# Patient Record
Sex: FEMALE | Race: BLACK OR AFRICAN AMERICAN | NOT HISPANIC OR LATINO | Employment: OTHER | URBAN - METROPOLITAN AREA
[De-identification: names, ages, dates, MRNs, and addresses within clinical notes are randomized per-mention and may not be internally consistent; named-entity substitution may affect disease eponyms.]

---

## 2018-02-01 ENCOUNTER — RADIANT APPOINTMENT (OUTPATIENT)
Dept: GENERAL RADIOLOGY | Facility: CLINIC | Age: 33
End: 2018-02-01
Attending: PHYSICIAN ASSISTANT
Payer: COMMERCIAL

## 2018-02-01 ENCOUNTER — OFFICE VISIT (OUTPATIENT)
Dept: URGENT CARE | Facility: URGENT CARE | Age: 33
End: 2018-02-01
Payer: COMMERCIAL

## 2018-02-01 VITALS — HEART RATE: 82 BPM | WEIGHT: 258.6 LBS | TEMPERATURE: 97 F | BODY MASS INDEX: 41.11 KG/M2 | OXYGEN SATURATION: 100 %

## 2018-02-01 DIAGNOSIS — S99.912A ANKLE INJURY, LEFT, INITIAL ENCOUNTER: ICD-10-CM

## 2018-02-01 DIAGNOSIS — S82.892A ANKLE FRACTURE, LEFT, CLOSED, INITIAL ENCOUNTER: Primary | ICD-10-CM

## 2018-02-01 PROCEDURE — 99204 OFFICE O/P NEW MOD 45 MIN: CPT | Performed by: PHYSICIAN ASSISTANT

## 2018-02-01 PROCEDURE — 73610 X-RAY EXAM OF ANKLE: CPT | Mod: LT

## 2018-02-01 ASSESSMENT — ENCOUNTER SYMPTOMS
RESPIRATORY NEGATIVE: 1
NEUROLOGICAL NEGATIVE: 1
GASTROINTESTINAL NEGATIVE: 1
PSYCHIATRIC NEGATIVE: 1
EYES NEGATIVE: 1
CARDIOVASCULAR NEGATIVE: 1
CONSTITUTIONAL NEGATIVE: 1

## 2018-02-01 NOTE — NURSING NOTE
"Chief Complaint   Patient presents with     Fall     Patient had fall and injured left leg       Initial Pulse 82  Temp 97  F (36.1  C) (Oral)  Wt 258 lb 9.6 oz (117.3 kg)  SpO2 100%  BMI 41.11 kg/m2 Estimated body mass index is 41.11 kg/(m^2) as calculated from the following:    Height as of 12/21/10: 5' 6.5\" (1.689 m).    Weight as of this encounter: 258 lb 9.6 oz (117.3 kg).  Medication Reconciliation: complete         Susannah Jose    "

## 2018-02-01 NOTE — PROGRESS NOTES
SUBJECTIVE:   Lani Lewis is a 32 year old female presenting with a chief complaint of   Chief Complaint   Patient presents with     Fall     Patient had fall and injured left leg   .    Onset of symptoms was 1 day(s) ago.  Course of illness is worsening.    Severity moderate  Current and Associated symptoms: pain, swelling, bruising, trouble walking  Treatment measures tried include wrap, icy hot.  Predisposing factors include None.    She fell last night  She slipped on the ice and rolled her ankle  She could not sleep last night  She can walk on it a little bit - it is painful  Pain is lateral  No numbness or tingling     Review of Systems   Constitutional: Negative.    HENT: Negative.    Eyes: Negative.    Respiratory: Negative.    Cardiovascular: Negative.    Gastrointestinal: Negative.    Genitourinary: Negative.    Musculoskeletal:        As in HPI   Skin: Negative.    Neurological: Negative.    Endo/Heme/Allergies: Negative.    Psychiatric/Behavioral: Negative.          Past Medical History:   Diagnosis Date     NO ACTIVE PROBLEMS      Current Outpatient Prescriptions   Medication Sig Dispense Refill     ibuprofen (ADVIL,MOTRIN) 600 MG tablet Take 1 tablet (600 mg) by mouth every 6 hours as needed for moderate pain (Patient not taking: Reported on 2/1/2018) 30 tablet 1     NO ACTIVE MEDICATIONS        Social History   Substance Use Topics     Smoking status: Never Smoker     Smokeless tobacco: Never Used     Alcohol use No     No significant family history     OBJECTIVE  Pulse 82  Temp 97  F (36.1  C) (Oral)  Wt 258 lb 9.6 oz (117.3 kg)  SpO2 100%  BMI 41.11 kg/m2    Physical Exam   Constitutional: She is oriented to person, place, and time and well-developed, well-nourished, and in no distress.   HENT:   Head: Normocephalic and atraumatic.   Neck: Normal range of motion. Neck supple.   Musculoskeletal:        Left ankle: She exhibits decreased range of motion, swelling and ecchymosis. She exhibits no  deformity, no laceration and normal pulse. Tenderness. Lateral malleolus, AITFL and head of 5th metatarsal tenderness found. No medial malleolus and no proximal fibula tenderness found.   Neurological: She is alert and oriented to person, place, and time. She has normal sensation.   Skin: Skin is warm and dry.   Psychiatric: Mood and affect normal.       Labs:  No results found for this or any previous visit (from the past 24 hour(s)).    XR ANKLE LT G/E 3 VW 2/1/2018 5:48 PM  HISTORY: Injury.  COMPARISON: None.  IMPRESSION: There is slight cortical irregularity of the distal tip of  the lateral malleolus, suggestive of a mildly displaced fracture with  overlying soft tissue swelling.  RUFUS FIGUEROA MD    ASSESSMENT:      ICD-10-CM    1. Ankle fracture, left, closed, initial encounter S82.892A ORTHO  REFERRAL     CAM WALKER     CRUTCHES, UNDERARM WOOD   2. Ankle injury, left, initial encounter S99.912A XR Ankle Left G/E 3 Views        PLAN:    MS Injury/Pain  ice, elevate, splint: CAM boot, Tylenol and Ibuprofen  Crutches given  Ok to WBAT in the CAM boot     Followup:    In 1  week(s) follow up with  Ortho    There are no Patient Instructions on file for this visit.    Laine Barakat PA-C

## 2018-02-01 NOTE — MR AVS SNAPSHOT
After Visit Summary   2/1/2018    Lani Lewis    MRN: [id number]           Patient Information     Date Of Birth          1985        Visit Information        Provider Department      2/1/2018 4:45 PM Laine Barakat PA-C [facility]        Today's Diagnoses     Ankle injury, left, initial encounter    -  1    Ankle fracture, left, closed, initial encounter           Follow-ups after your visit        Additional Services     ORTHO  REFERRAL       [facility] is referring you to the Orthopedic  Services at [facility].       The  Representative will assist you in the coordination of your Orthopedic and Musculoskeletal Care as prescribed by your physician.    The  Representative will call you within 1 business day to help schedule your appointment, or you may contact the  Representative at:    All areas ~ [phone]     Type of Referral : [facility]       Timeframe requested: 1 - 2 days    Coverage of these services is subject to the terms and limitations of your health insurance plan.  Please call member services at your health plan with any benefit or coverage questions.      If X-rays, CT or MRI's have been performed, please contact the facility where they were done to arrange for , prior to your scheduled appointment.  Please bring this referral request to your appointment and present it to your specialist.                  Who to contact     If you have questions or need follow up information about today's clinic visit or your schedule please contact [facility] directly at [phone].  Normal or non-critical lab and imaging results will be communicated to you by MyChart, letter or phone within 4 business days after the clinic has received the results. If you do not hear from us within 7 days, please contact the clinic  "through Spongecell or phone. If you have a critical or abnormal lab result, we will notify you by phone as soon as possible.  Submit refill requests through Spongecell or call your pharmacy and they will forward the refill request to us. Please allow 3 business days for your refill to be completed.          Additional Information About Your Visit        [employer] Information     Spongecell lets you send messages to your doctor, view your test results, renew your prescriptions, schedule appointments and more. To sign up, go to [email or web address] . Click on \"Log in\" on the left side of the screen, which will take you to the Welcome page. Then click on \"Sign up Now\" on the right side of the page.     You will be asked to enter the access code listed below, as well as some personal information. Please follow the directions to create your username and password.     Your access code is: [email or web address]  Expires: 2018  6:19 PM     Your access code will  in 90 days. If you need help or a new code, please call your [facility] clinic or [phone].        Care EveryWhere ID     This is your Care EveryWhere ID. This could be used by other organizations to access your [facility] medical records  [id number]        Your Vitals Were     Pulse Temperature Pulse Oximetry BMI (Body Mass Index)          82 97  F (36.1  C) (Oral) 100% 41.11 kg/m2         Blood Pressure from Last 3 Encounters:   08/05/15 (!) 127/91   11 92/60   12/21/10 93/48    Weight from Last 3 Encounters:   18 258 lb 9.6 oz (117.3 kg)   11 254 lb (115.2 kg)   12/21/10 260 lb (117.9 kg)              We Performed the Following     CAM WALKER     CRUTCHES, UNDERARM WOOD     ORTHO  REFERRAL        Primary Care Provider Fax #    Physician No Ref-Primary [phone]       No address on file        Equal Access to Services     NIKA VARGAS : Chino Galvan, waesmeda roque, qaybdaniel britton, ekaterina kelly " rodrigo grant ah. So [facility] [phone].    ATENCIÓN: Si habla liset, tiene a adam disposición servicios gratuitos de asistencia lingüística. Glenn al [phone].    We comply with applicable federal civil rights laws and [place] laws. We do not discriminate on the basis of race, color, national origin, age, disability, sex, sexual orientation, or gender identity.            Thank you!     Thank you for choosing [facility]  for your care. Our goal is always to provide you with excellent care. Hearing back from our patients is one way we can continue to improve our services. Please take a few minutes to complete the written survey that you may receive in the mail after your visit with us. Thank you!             Your Updated Medication List - Protect others around you: Learn how to safely use, store and throw away your medicines at www.disposemymeds.org.          This list is accurate as of 2/1/18  6:19 PM.  Always use your most recent med list.                   Brand Name Dispense Instructions for use Diagnosis    ibuprofen 600 MG tablet    ADVIL/MOTRIN    30 tablet    Take 1 tablet (600 mg) by mouth every 6 hours as needed for moderate pain        NO ACTIVE MEDICATIONS

## 2018-02-08 ENCOUNTER — OFFICE VISIT (OUTPATIENT)
Dept: PODIATRY | Facility: CLINIC | Age: 33
End: 2018-02-08
Payer: COMMERCIAL

## 2018-02-08 VITALS
DIASTOLIC BLOOD PRESSURE: 66 MMHG | BODY MASS INDEX: 40.01 KG/M2 | WEIGHT: 264 LBS | HEIGHT: 68 IN | SYSTOLIC BLOOD PRESSURE: 114 MMHG

## 2018-02-08 DIAGNOSIS — S82.832A CLOSED FRACTURE OF DISTAL END OF LEFT FIBULA, UNSPECIFIED FRACTURE MORPHOLOGY, INITIAL ENCOUNTER: Primary | ICD-10-CM

## 2018-02-08 PROCEDURE — 99243 OFF/OP CNSLTJ NEW/EST LOW 30: CPT | Performed by: PODIATRIST

## 2018-02-08 RX ORDER — HYDROCODONE BITARTRATE AND ACETAMINOPHEN 5; 325 MG/1; MG/1
TABLET ORAL
Qty: 30 TABLET | Refills: 0 | Status: SHIPPED | OUTPATIENT
Start: 2018-02-08 | End: 2018-07-18

## 2018-02-08 NOTE — NURSING NOTE
"Chief Complaint   Patient presents with     Ankle Pain     left ankle       Initial /66 (BP Location: Left arm, Patient Position: Sitting, Cuff Size: Adult Large)  Ht 5' 7.84\" (1.723 m)  Wt 264 lb (119.7 kg)  BMI 40.34 kg/m2 Estimated body mass index is 40.34 kg/(m^2) as calculated from the following:    Height as of this encounter: 5' 7.84\" (1.723 m).    Weight as of this encounter: 264 lb (119.7 kg).  Medication Reconciliation: complete    "

## 2018-02-08 NOTE — Clinical Note
Good morning  I saw Lani recently for her left ankle injury.  She was placed into a tall walking boot, given RICE/NSAID instructions, as well as a compression sleeve.  She'll follow up in 3 weeks and we'll likely start functional rehab at that point.  Thanks  Andre

## 2018-02-08 NOTE — MR AVS SNAPSHOT
After Visit Summary   2/8/2018    Lani Lewis    MRN: [id number]           Patient Information     Date Of Birth          1985        Visit Information        Provider Department      2/8/2018 1:45 PM Andre Ortiz DPM; [employer] [facility]        Today's Diagnoses     Closed fracture of distal end of left fibula, unspecified fracture morphology, initial encounter    -  1      Care Instructions    Thank you for choosing [facility]!    DR. ORTIZ'S CLINIC LOCATIONS:   MONDAY - [place]  TUESDAY - [place]   [address] [facility]  [address]   [place] [address] [place] [address]   [phone]       THURSDAY AM - [place] THURSDAY PM - [facility]   [address]   [place] [address] [place] [address]   [phone]       FRIDAY AM - [place] SET UP SURGERY: [phone]   [address] APPOINTMENTS: [phone]   [place] [address] BILLING QUESTIONS: [phone] FAX NUMBER: [phone]     Follow Up: in 3 weeks    PRICE Therapy    Many aches and pains throughout the foot and ankle can be helped with many simple treatments.  This is usually described as PRICE Therapy.      P - Protection - often times, inflammation/pain in the lower extremity is not able to improve simply because the areas involved are never allowed to rest.  Every step we take can bother the problematic area.  Protecting those areas is an important step in the healing process.  This may involve a walking cast boot, a special insert/orthotic device, an ankle brace, or simply avoiding barefoot walking.    R - Rest - in addition to protecting the foot/ankle, resting is an important, but often times difficult, treatment option.  Getting off your feet when they bother you, and specifically avoiding activities that cause pain/discomfort, are very beneficial to  prevent, and treat, foot/ankle pain.      I - Ice - icing regularly can help to decrease inflammation and swelling in the foot, thus decreasing pain.  Using an ice pack or a bag of frozen peas works very well.  Ice for 20 minutes multiple times per day as needed.  Do not place the ice directly on the skin as this can cause tissue damage.    C - Compression - using a compression wrap or an ACE wrap can help to decrease swelling, which can help to decrease pain.  Wearing the wraps is generally not needed at night, but they should be worn on a regular basis when you are going to be on your feet for prolonged periods as gravity tends to pull fluids down to your feet/ankles.    E - Elevation - elevating your lower extremities multiple times daily for 15-20 minutes can help to decrease swelling, which works well in decreasing pain levels.      NSAID/Tylenol - An anti-inflammatory, like Aleve or ibuprofen, and/or a pain medication, such as Tylenol, can help to improve pain levels and get the issue resolved sooner rather than later.  Anyone with liver issues should be careful with Tylenol, and anyone with high blood pressure or heart, stomach or kidney issues should be careful with anti-inflammatories.  Please ask if you have questions about these medications, including dosage.        Body Mass Index (BMI)  Many things can cause foot and ankle problems. Foot structure, activity level, foot mechanics and injuries are common causes of pain. One very important issue that often goes unmentioned, is body weight. Extra weight can cause increased stress on muscles, ligaments, bones and tendons. Sometimes just a few extra pounds is all it takes to put one over her/his threshold. Without reducing that stress, it can be difficult to alleviate pain. Some people are uncomfortable addressing this issue, but we feel it is important for you to think about it. As Foot &  Ankle specialists, our job is addressing the lower extremity problem  "and possible causes. Regarding extra body weight, we encourage patients to discuss diet and weight management plans with their primary care doctors. It is this team approach that gives you the best opportunity for pain relief and getting you back on your feet.            Follow-ups after your visit        Who to contact     If you have questions or need follow up information about today's clinic visit or your schedule please contact [facility] directly at [phone].  Normal or non-critical lab and imaging results will be communicated to you by [employer], letter or phone within 4 business days after the clinic has received the results. If you do not hear from us within 7 days, please contact the clinic through [employer] or phone. If you have a critical or abnormal lab result, we will notify you by phone as soon as possible.  Submit refill requests through [email or web address] or call your pharmacy and they will forward the refill request to us. Please allow 3 business days for your refill to be completed.          Additional Information About Your Visit        Meet.com Information     Meet.com lets you send messages to your doctor, view your test results, renew your prescriptions, schedule appointments and more. To sign up, go to [email or web address] . Click on \"Log in\" on the left side of the screen, which will take you to the Welcome page. Then click on \"Sign up Now\" on the right side of the page.     You will be asked to enter the access code listed below, as well as some personal information. Please follow the directions to create your username and password.     Your access code is: [email or web address]  Expires: 2018  6:19 PM     Your access code will  in 90 days. If you need help or a new code, please call your [facility] clinic or [phone].        Care EveryWhere ID     This is your Care EveryWhere ID. This could be used by other organizations to access your [facility] medical records  [id number]      " "  Your Vitals Were     Height BMI (Body Mass Index)                5' 7.84\" (1.723 m) 40.34 kg/m2           Blood Pressure from Last 3 Encounters:   02/08/18 114/66   08/05/15 (!) 127/91   01/21/11 92/60    Weight from Last 3 Encounters:   02/08/18 264 lb (119.7 kg)   02/01/18 258 lb 9.6 oz (117.3 kg)   01/21/11 254 lb (115.2 kg)              Today, you had the following     No orders found for display         Today's Medication Changes          These changes are accurate as of 2/8/18  2:29 PM.  If you have any questions, ask your nurse or doctor.               Start taking these medicines.        Dose/Directions    HYDROcodone-acetaminophen 5-325 MG per tablet   Commonly known as:  NORCO   Used for:  Closed fracture of distal end of left fibula, unspecified fracture morphology, initial encounter   Started by:  Andre Garcia DPM        Take 1-2 tablets every 4-6 hours as needed for pain.  Do not take other tylenol products with this medication, as too much tylenol can be damaging to the liver.   Quantity:  30 tablet   Refills:  0       order for DME   Used for:  Closed fracture of distal end of left fibula, unspecified fracture morphology, initial encounter   Started by:  Andre Garcia DPM        Equipment being ordered: tall CAm 9 1/2   Quantity:  1 Device   Refills:  0            Where to get your medicines      Some of these will need a paper prescription and others can be bought over the counter.  Ask your nurse if you have questions.     Bring a paper prescription for each of these medications     HYDROcodone-acetaminophen 5-325 MG per tablet    order for DME                Primary Care Provider Fax #    Physician No Ref-Primary [phone]       No address on file        Equal Access to Services     NIKA VARGAS : Chino Galvan, adrian nevarez, ekaterina naylor. So [facility] [phone].    ATENCIÓN: Si jeanmarie gonzalez a adam " disposición servicios gratuitos de asistencia lingüística. Glenn lafleur [phone].    We comply with applicable federal civil rights laws and [place] laws. We do not discriminate on the basis of race, color, national origin, age, disability, sex, sexual orientation, or gender identity.            Thank you!     Thank you for choosing [facility]  for your care. Our goal is always to provide you with excellent care. Hearing back from our patients is one way we can continue to improve our services. Please take a few minutes to complete the written survey that you may receive in the mail after your visit with us. Thank you!             Your Updated Medication List - Protect others around you: Learn how to safely use, store and throw away your medicines at www.disposemymeds.org.          This list is accurate as of 2/8/18  2:29 PM.  Always use your most recent med list.                   Brand Name Dispense Instructions for use Diagnosis    HYDROcodone-acetaminophen 5-325 MG per tablet    NORCO    30 tablet    Take 1-2 tablets every 4-6 hours as needed for pain.  Do not take other tylenol products with this medication, as too much tylenol can be damaging to the liver.    Closed fracture of distal end of left fibula, unspecified fracture morphology, initial encounter       ibuprofen 600 MG tablet    ADVIL/MOTRIN    30 tablet    Take 1 tablet (600 mg) by mouth every 6 hours as needed for moderate pain        NO ACTIVE MEDICATIONS           order for DME     1 Device    Equipment being ordered: tall CAm 9 1/2    Closed fracture of distal end of left fibula, unspecified fracture morphology, initial encounter

## 2018-02-08 NOTE — PATIENT INSTRUCTIONS
Thank you for choosing [facility]!    DR. ORTIZ'S CLINIC LOCATIONS:   MONDAY - [place] TUESDAY - [place]   [address] [facility]  [address]   [place] [address] [place], [place] [id number] [phone]       THURSDAY AM - [place] THURSDAY PM - [facility]   [address]   [place] [address] [place], [place] [address] [phone]       FRIDAY AM - [place] SET UP SURGERY: [phone] [address] APPOINTMENTS: [phone]   [place] [address] BILLING QUESTIONS: [phone] FAX NUMBER: [phone]     Follow Up: in 3 weeks    PRICE Therapy    Many aches and pains throughout the foot and ankle can be helped with many simple treatments.  This is usually described as PRICE Therapy.      P - Protection - often times, inflammation/pain in the lower extremity is not able to improve simply because the areas involved are never allowed to rest.  Every step we take can bother the problematic area.  Protecting those areas is an important step in the healing process.  This may involve a walking cast boot, a special insert/orthotic device, an ankle brace, or simply avoiding barefoot walking.    R - Rest - in addition to protecting the foot/ankle, resting is an important, but often times difficult, treatment option.  Getting off your feet when they bother you, and specifically avoiding activities that cause pain/discomfort, are very beneficial to prevent, and treat, foot/ankle pain.      I - Ice - icing regularly can help to decrease inflammation and swelling in the foot, thus decreasing pain.  Using an ice pack or a bag of frozen peas works very well.  Ice for 20 minutes multiple times per day as needed.  Do not place the ice directly on the skin as this can cause tissue damage.    C - Compression - using a compression wrap or an ACE wrap can help to decrease swelling, which can help to decrease pain.   Wearing the wraps is generally not needed at night, but they should be worn on a regular basis when you are going to be on your feet for prolonged periods as gravity tends to pull fluids down to your feet/ankles.    E - Elevation - elevating your lower extremities multiple times daily for 15-20 minutes can help to decrease swelling, which works well in decreasing pain levels.      NSAID/Tylenol - An anti-inflammatory, like Aleve or ibuprofen, and/or a pain medication, such as Tylenol, can help to improve pain levels and get the issue resolved sooner rather than later.  Anyone with liver issues should be careful with Tylenol, and anyone with high blood pressure or heart, stomach or kidney issues should be careful with anti-inflammatories.  Please ask if you have questions about these medications, including dosage.        Body Mass Index (BMI)  Many things can cause foot and ankle problems. Foot structure, activity level, foot mechanics and injuries are common causes of pain. One very important issue that often goes unmentioned, is body weight. Extra weight can cause increased stress on muscles, ligaments, bones and tendons. Sometimes just a few extra pounds is all it takes to put one over her/his threshold. Without reducing that stress, it can be difficult to alleviate pain. Some people are uncomfortable addressing this issue, but we feel it is important for you to think about it. As Foot &  Ankle specialists, our job is addressing the lower extremity problem and possible causes. Regarding extra body weight, we encourage patients to discuss diet and weight management plans with their primary care doctors. It is this team approach that gives you the best opportunity for pain relief and getting you back on your feet.

## 2018-02-08 NOTE — PROGRESS NOTES
"Foot & Ankle Surgery  February 8, 2018    CC: left ankle injury    I was asked to see Lanimayito Lewis regarding the chief complaint by:  CASH can    HPI:  Pt is a 32 year old female who presents with above complaint.  Inversion left ankle injury stepping out of a car 7 days ago.  She was seen by Dr. Can and had xrays 2/1/18 that showed irregularity at the distal fibula consistent with mildly displaced fracture.  Describes a dull pain, 8/10 \"all day - mostly night\".  She has been taking \"medicine\" and has a short boot.  Moderate swelling noted.      ROS:   Pos for CC.  The patient denies current nausea, vomiting, chills, fevers, belly pain, calf pain, chest pain or SOB.  Complete remainder of ROS is otherwise neg.    VITALS:    Vitals:    02/08/18 1412   BP: 114/66   BP Location: Left arm   Patient Position: Sitting   Cuff Size: Adult Large   Weight: 264 lb (119.7 kg)   Height: 5' 7.84\" (1.723 m)       PMH:    Past Medical History:   Diagnosis Date     NO ACTIVE PROBLEMS        SXHX:  No past surgical history on file.     MEDS:    Current Outpatient Prescriptions   Medication     ibuprofen (ADVIL,MOTRIN) 600 MG tablet     NO ACTIVE MEDICATIONS     No current facility-administered medications for this visit.        ALL:   No Known Allergies    FMH:  No family history on file.    SocHx:    Social History     Social History     Marital status:      Spouse name: N/A     Number of children: N/A     Years of education: N/A     Occupational History     Not on file.     Social History Main Topics     Smoking status: Never Smoker     Smokeless tobacco: Never Used     Alcohol use No     Drug use: No     Sexual activity: Yes     Partners: Male     Other Topics Concern     Not on file     Social History Narrative           EXAMINATION:  Gen:   No apparent distress  Neuro:   A&Ox3, no deficits  Psych:    Answering questions appropriately for age and situation with normal affect  Head:    NCAT  Eye:    Visual " scanning without deficit  Ear:    Response to auditory stimuli wnl  Lung:    Non-labored breathing on RA noted  Abd:    NTND per patient report  Lymph:    Moderate left ankle edema  Vasc:    Pulses palpable, CFT minimally delayed  Neuro:    Light touch sensation intact to all sensory nerve distributions without paresthesias  Derm:    Neg for nodules, lesions or ulcerations  MSK:    Left lower extremity - knee-to-ankle neg.  Slight deltoid discomfort.  Tender at distal fibular.  Very tender at anterior calcaneus, tender at 5th met base  Calf:    Neg for redness, swelling or tenderness      Imaging:  xrays L ankle 2/1/18 - IMPRESSION: There is slight cortical irregularity of the distal tip of  the lateral malleolus, suggestive of a mildly displaced fracture with  overlying soft tissue swelling.    Assessment:  32 year old female with inversion left ankle injury with avulsion fracture of the distal fibula      Plan:  Discussed etiologies, anatomy and options  1.  Inversion left ankle sprain with avulsion fracture of distal fibula  -personally reviewed imaging  -aggressive RICE/NSAID vs tylenol; tensogrip for edema control  -tall Aircast boot dispensed in place of current short boot  -WBAT in boot  -PT for functional rehab once acute inflammation/swelling have resolved    Follow up:  3 weeks or sooner with acute issues      Patient's medical history was reviewed today    Body mass index is 40.34 kg/(m^2).  Weight management plan: Patient was referred to their PCP to discuss a diet and exercise plan.        Andre Garcia DPM   Podiatric Foot & Ankle Surgeon  [facility]  [phone]

## 2018-03-05 LAB
ABO + RH BLD: NORMAL
ABO + RH BLD: NORMAL
BLD GP AB SCN SERPL QL: NEGATIVE
CULTURE MICRO: NEGATIVE
HBV SURFACE AG SERPL QL IA: NEGATIVE
HCT VFR BLD AUTO: 36.9 %
HEMOGLOBIN: 11.6 G/DL (ref 11.7–15.7)
HIV 1+2 AB+HIV1 P24 AG SERPL QL IA: NEGATIVE
PLATELET # BLD AUTO: 223 10^9/L
RUBELLA ANTIBODY IGG QUANTITATIVE: NORMAL IU/ML

## 2018-03-15 LAB — PAP: NEGATIVE

## 2018-07-18 ENCOUNTER — PRENATAL OFFICE VISIT (OUTPATIENT)
Dept: NURSING | Facility: CLINIC | Age: 33
End: 2018-07-18
Payer: COMMERCIAL

## 2018-07-18 VITALS
HEART RATE: 76 BPM | WEIGHT: 278.4 LBS | HEIGHT: 68 IN | BODY MASS INDEX: 42.19 KG/M2 | SYSTOLIC BLOOD PRESSURE: 98 MMHG | DIASTOLIC BLOOD PRESSURE: 72 MMHG | TEMPERATURE: 98.6 F

## 2018-07-18 DIAGNOSIS — Z23 NEED FOR TDAP VACCINATION: Primary | ICD-10-CM

## 2018-07-18 DIAGNOSIS — O09.40 GRAND MULTIPARITY WITH CURRENT PREGNANCY: ICD-10-CM

## 2018-07-18 PROBLEM — Z86.32 HISTORY OF GESTATIONAL DIABETES: Status: ACTIVE | Noted: 2018-03-05

## 2018-07-18 PROBLEM — O09.299 HISTORY OF MACROSOMIA IN INFANT IN PRIOR PREGNANCY, CURRENTLY PREGNANT: Status: ACTIVE | Noted: 2018-03-05

## 2018-07-18 PROBLEM — O99.210 MATERNAL OBESITY AFFECTING PREGNANCY, ANTEPARTUM: Status: ACTIVE | Noted: 2018-03-05

## 2018-07-18 LAB — GLUCOSE 1H P 50 G GLC PO SERPL-MCNC: 103 MG/DL (ref 60–129)

## 2018-07-18 PROCEDURE — 82950 GLUCOSE TEST: CPT | Performed by: OBSTETRICS & GYNECOLOGY

## 2018-07-18 PROCEDURE — 36415 COLL VENOUS BLD VENIPUNCTURE: CPT | Performed by: OBSTETRICS & GYNECOLOGY

## 2018-07-18 PROCEDURE — 86780 TREPONEMA PALLIDUM: CPT | Performed by: OBSTETRICS & GYNECOLOGY

## 2018-07-18 PROCEDURE — 99207 ZZC NO CHARGE NURSE ONLY: CPT

## 2018-07-18 RX ORDER — DOCUSATE SODIUM 100 MG/1
100 CAPSULE, LIQUID FILLED ORAL
COMMUNITY
Start: 2018-06-04 | End: 2019-03-06

## 2018-07-18 RX ORDER — PRENATAL VIT,CAL 73/IRON/FOLIC 28 MG-1 MG
TABLET ORAL
COMMUNITY
Start: 2018-03-05 | End: 2018-09-04

## 2018-07-18 RX ORDER — FERROUS SULFATE 325(65) MG
325 TABLET ORAL
Status: ON HOLD | COMMUNITY
Start: 2018-06-04 | End: 2018-10-27

## 2018-07-18 NOTE — LETTER
July 18, 2018      Lani Lewis  [address] [address]        Dear ,    We are writing to inform you of your test results.    Your test results fall within the expected range(s) or remain unchanged from previous results.  Please continue with current treatment plan.    Resulted Orders   Glucose tolerance, gest screen, 1 hour   Result Value Ref Range    Glu Gest Screen 1hr 50g 103 60 - 129 mg/dL       If you have any questions or concerns, please call the clinic at the number listed above.       Sincerely,        LANGUAGE BANC

## 2018-07-18 NOTE — PROGRESS NOTES
Important Information for Provider:   Patient presents for new ob transfer from [facility], eighth pregnancy, ultrasound done 6/04/18. patient is doing her 1 hour GCT today. History of gestational diabetes last 3 pregnancies.. Has NOB appointment with Dr Shepherd tomorrow 7/19/18. All NOB labs were drawn at  3/2018 except for trep( drawn today) . Ordered fetal survey at [facility]. A1C drawn 4.3 at [facility] 3/5/18      Prenatal OB Questionnaire    Patient supplied answers from flow sheet for:  Prenatal OB Questionnaire.  Past Medical History  Diabetes?: No  Hypertension : No  Heart disease, mitral valve prolapse or rheumatic fever?: No  An autoimmune disease such as lupus or rheumatoid arthritis?: No  Kidney disease or urinary tract infection?: No  Epilepsy, seizures or spells?: No  Migraine headaches?: No  A stroke or loss of function or sensation?: No  Any other neurological problems?: No  Have you ever been treated for depression?: No  Are you having problems with crying spells or loss of self-esteem?: No  Have you ever required psychiatric care?: No  Have you ever had hepatitis, liver disease or jaundice?: No  Have you been treated for blood clots in your veins, deep vein thromosis, inflammation in the veins, thrombosis, phlebitis, pulmonary embolism or varicosities?: No  Have you had excessive bleeding after surgery or dental work?: No  Do you bleed more than other women after a cut or scratch?: No  Do you have a history of anemia?: No  Have you ever had thyroid problems or taken thyroid medication?: No   Do you have any endocrine problems?: No  Have you ever been in a major accident or suffered serious trauma?: No  Within the last year, has anyone hit, slapped, kicked or otherwise hurt you?: No  In the last year, has anyone forced you to have sex when you didn't want to?: No    Past Medical History 2   Have you ever received a blood transfusion?: No  Would you refuse a blood transfusion if a doctor  judged it to be medically necessary?: No   If you answered Yes, would you rather die than receive a blood transfusion?: No  If you answered Yes, is this for Hindu reasons?: No  Does anyone in your home smoke?: No  Do you use tobacco products?: No  Do you drink beer, wine or hard liquor?: No  Do you use any of the following: marijuana, speed, cocaine, heroin, hallucinogens or other drugs?: No   Is your blood type Rh negative?: No  Have you ever had abnormal antibodies in your blood?: No  Have you ever had asthma?: No  Have you ever had tuberculosis?: No  Do you have any allergies to drugs or over-the-counter medications?: No  Allergies: Dust Mites, Aspartame, Ethanol, Venlafaxine, Hydrochloride, Sertraline: No  Have you had any breast problems?: No  Have you ever ?: (!) Yes  Have you had any gynecological surgical procedures such as cervical conization, a LEEP procedure, laser treatment, cryosurgery of the cervix or a dilation and curettage, etc?: No  Have you ever had any other surgical procedures?: No  Have you been hospitalized for a nonsurgical reason excluding normal delivery?: No  Have you ever had any anesthetic complications?: No  Have you ever had an abnormal pap smear?: No    Past Medical History (Continued)  Do you have a history of abnormalities of the uterus?: No  Did your mother take NASREEN or any other hormones when she was pregnant with you?: No  Did it take you more than a year to become pregnant?: No  Have you ever been evaluated or treated for infertility?: No  Is there a history of medical problems in your family, which you feel may be important to this pregnancy?: No  Do you have any other problems we have not asked about which you feel may be important to this pregnancy?: No    Symptoms since last menstrual period  Do you have any of the following symptoms: abdominal pain, blood in stools or urine, chest pain, shortness of breath, coughing or vomiting up blood, your heart racing or  skipping beats, nausea and vomiting, pain on urination or vaginal discharge or bleed: No  Will the patient be 35 years old or older at the time of delivery?: No    Has the patient, baby's father or anyone in either family had:  Thalassemia (Italian, Greek, Mediterranean or  background only) and an MCV result less than 80?: No  Neural tube defect such as meningomyelocele, spina bifida or anencephaly?: No  Congenital heart defect?: No  Down's Syndrome?: No  Carlos-Sachs disease (Muslim, Cajun, Macedonian-Turks and Caicos Islander)?: No  Sickle cell disease or trait ()?: No  Hemophilia or other inherited problems of blood?: No  Muscular dystrophy?: No  Cystic fibrosis?: No  Powell's chorea?: No  Mental retardation/autism?: No  If yes, was the person tested for fragile X?: No  Any other inherited genetic or chromosomal disorder?: No  Maternal metabolic disorder (e.g Insulin-dependent diabetes, PKU)?: No  A child with birth defects not listed above?: No  Recurrent pregnancy loss or stillbirth?: No   Has the patient had any medications/street drugs/alcohol since her last menstrual period?: No  Does the patient or baby's father have any other genetic risks?: No    Infection History   Do you object to being tested for Hepatitis B?: No  Do you object to being tested for HIV?: No   Do you feel that you are at high risk for coming in contact with the AIDS virus?: No  Have you ever been treated for tuberculosis?: No  Have you ever had a positive skin test for tuberculosis?: No  Do you live with someone who has tuberculosis?: No  Have you ever been exposed to tuberculosis?: No  Do you have genital herpes?: No  Does your partner have genital herpes?: No  Have you had a viral illness since your last period?: No  Have you ever had gonorrhea, chlamydia, syphilis, venereal warts, trichomoniasis, pelvic inflammatory disease or any other sexually transmitted disease?: No  Do you know if you are a genital group B streptococcus  carrier?:Yes  Have you had chicken pox/varicella?: No   Have you been vaccinated against chicken Pox?: No  Have you had any other infectious diseases?: No    Allergies as of 7/18/2018:    Allergies as of 07/18/2018     (No Known Allergies)       Current medications are:  Current Outpatient Prescriptions   Medication Sig Dispense Refill     docusate sodium (COLACE) 100 MG capsule Take 100 mg by mouth       ferrous sulfate (IRON) 325 (65 Fe) MG tablet Take 325 mg by mouth       order for DME Equipment being ordered: tall CAm 9 1/2 1 Device 0     Prenatal Vit-Fe Fumarate-FA (TRINATE) TABS            Early ultrasound screening tool:    Does patient have irregular periods?  No  Did patient use hormonal birth control in the three months prior to positive urine pregnancy test? No  Is the patient breastfeeding?  No  Is the patient 10 weeks or greater at time of education visit?  Yes

## 2018-07-18 NOTE — MR AVS SNAPSHOT
After Visit Summary   7/18/2018    Lani Lewis    MRN: [id number]           Patient Information     Date Of Birth          1985        Visit Information        Provider Department      7/18/2018 10:45 AM LANGUAGE NEHAL; [place] NURSE EDUC[place] [facility]        Today's Diagnoses     Supervision of normal pregnancy    -  1    Need for Tdap vaccination           Follow-ups after your visit        Additional Services     MAT FETAL MED CTR REFERRAL-PREGNANCY       Body mass index is 42.96 kg/(m^2).    >> Patient may proceed with recommendations for further testing as directed by the Maternal Fetal Medicine Specialist >>    >> If requesting Fetal Echo: MFM will determine appropriate location for exam due to indication.    >> If requesting Lung Maturity Amnio:  If results indicate fetal lung maturity, induction or C/S is recommended within 36 hours.  Please schedule accordingly.     Dear Patient:   Please be aware that coverage of these services is subject to the terms and limitations of your health insurance plan.  Call member services at your health plan with any benefit or coverage questions.      Please bring the following to your appointment:    >>  Any x-rays, CTs or MRIs which have been performed.  Contact the facility where they were done to arrange for  prior to your scheduled appointment.  Any new CT, MRI or other procedures ordered by your specialist must be performed at a [facility] facility or coordinated by your clinic's referral office.  >>  List of current medications   >>  This referral request   >>  Any documents/labs given to you for this referral                  Your next 10 appointments already scheduled     Jul 19, 2018 12:15 PM CDT   New Prenatal with Shelly Shepherd MD   [facility] [place]    [address] [place] [id number] [phone]              Who to contact     If you have questions  "or need follow up information about today's clinic visit or your schedule please contact [facility] directly at [phone].  Normal or non-critical lab and imaging results will be communicated to you by MyChart, letter or phone within 4 business days after the clinic has received the results. If you do not hear from us within 7 days, please contact the clinic through Acrolinxhart or phone. If you have a critical or abnormal lab result, we will notify you by phone as soon as possible.  Submit refill requests through CITTIO or call your pharmacy and they will forward the refill request to us. Please allow 3 business days for your refill to be completed.          Additional Information About Your Visit        AcrolinxharBugHerd Information     CITTIO lets you send messages to your doctor, view your test results, renew your prescriptions, schedule appointments and more. To sign up, go to [email or web address] . Click on \"Log in\" on the left side of the screen, which will take you to the Welcome page. Then click on \"Sign up Now\" on the right side of the page.     You will be asked to enter the access code listed below, as well as some personal information. Please follow the directions to create your username and password.     Your access code is: [email or web address]  Expires: 10/16/2018 12:28 PM     Your access code will  in 90 days. If you need help or a new code, please call your [place] clinic or [phone].        Care EveryWhere ID     This is your Care EveryWhere ID. This could be used by other organizations to access your [place] medical records  [id number]        Your Vitals Were     Pulse Temperature Height BMI (Body Mass Index)          76 98.6  F (37  C) 5' 7.5\" (1.715 m) 42.96 kg/m2         Blood Pressure from Last 3 Encounters:   18 98/72   18 114/66   08/05/15 (!) 127/91    Weight from Last 3 Encounters:   18 278 lb 6.4 oz (126.3 kg)   18 264 lb (119.7 kg)   18 258 " lb 9.6 oz (117.3 kg)              We Performed the Following     ABO and Rh     CBC with platelets     Conventional pap smear, diagnostic     Glucose tolerance, gest screen, 1 hour     Hepatitis B surface antigen     HIV Antigen Antibody Combo     MAT FETAL MED CTR REFERRAL-PREGNANCY     OB hemoglobin     Rubella Antibody IgG Quantitative     Treponema Abs w Reflex to RPR and Titer     Urine Culture Aerobic Bacterial        Primary Care Provider Fax #    Physician No Ref-Primary [phone]       No address on file        Equal Access to Services     NIKA VARGAS : Hadii aad ku hadasho Soomaali, waaxda luqadaha, qaybta kaalmada adeegyada, waxay idiin haylanien adeeg rodrigo labethtaiwo . So [facility] [phone].    ATENCIÓN: Si rosela liset, tiene a adam disposición servicios gratuitos de asistencia lingüística. Llame al [phone].    We comply with applicable federal civil rights laws and [place] laws. We do not discriminate on the basis of race, color, national origin, age, disability, sex, sexual orientation, or gender identity.            Thank you!     Thank you for choosing [facility]  for your care. Our goal is always to provide you with excellent care. Hearing back from our patients is one way we can continue to improve our services. Please take a few minutes to complete the written survey that you may receive in the mail after your visit with us. Thank you!             Your Updated Medication List - Protect others around you: Learn how to safely use, store and throw away your medicines at www.disposemymeds.org.          This list is accurate as of 7/18/18 12:28 PM.  Always use your most recent med list.                   Brand Name Dispense Instructions for use Diagnosis    docusate sodium 100 MG capsule    COLACE     Take 100 mg by mouth        ferrous sulfate 325 (65 Fe) MG tablet    IRON     Take 325 mg by mouth        order for DME     1 Device    Equipment being ordered: tall CAm 9 1/2     Closed fracture of distal end of left fibula, unspecified fracture morphology, initial encounter       TRINATE Tabs

## 2018-07-19 LAB — T PALLIDUM AB SER QL: NONREACTIVE

## 2018-07-26 ENCOUNTER — OFFICE VISIT (OUTPATIENT)
Dept: INTERPRETER SERVICES | Facility: CLINIC | Age: 33
End: 2018-07-26
Payer: COMMERCIAL

## 2018-07-26 ENCOUNTER — PRENATAL OFFICE VISIT (OUTPATIENT)
Dept: OBGYN | Facility: CLINIC | Age: 33
End: 2018-07-26
Payer: COMMERCIAL

## 2018-07-26 VITALS
WEIGHT: 281.6 LBS | TEMPERATURE: 97.4 F | DIASTOLIC BLOOD PRESSURE: 69 MMHG | SYSTOLIC BLOOD PRESSURE: 105 MMHG | HEART RATE: 96 BPM | BODY MASS INDEX: 43.45 KG/M2

## 2018-07-26 DIAGNOSIS — Z23 NEED FOR TDAP VACCINATION: ICD-10-CM

## 2018-07-26 DIAGNOSIS — Z34.02 ENCOUNTER FOR SUPERVISION OF NORMAL FIRST PREGNANCY IN SECOND TRIMESTER: Primary | ICD-10-CM

## 2018-07-26 DIAGNOSIS — K21.9 GASTROESOPHAGEAL REFLUX DISEASE WITHOUT ESOPHAGITIS: ICD-10-CM

## 2018-07-26 DIAGNOSIS — O09.299 HISTORY OF MACROSOMIA IN INFANT IN PRIOR PREGNANCY, CURRENTLY PREGNANT: ICD-10-CM

## 2018-07-26 PROBLEM — Z34.00 ENCOUNTER FOR SUPERVISION OF NORMAL FIRST PREGNANCY: Status: ACTIVE | Noted: 2018-07-26

## 2018-07-26 PROCEDURE — 90715 TDAP VACCINE 7 YRS/> IM: CPT | Performed by: OBSTETRICS & GYNECOLOGY

## 2018-07-26 PROCEDURE — 99207 ZZC FIRST OB VISIT: CPT | Performed by: OBSTETRICS & GYNECOLOGY

## 2018-07-26 PROCEDURE — 90471 IMMUNIZATION ADMIN: CPT | Performed by: OBSTETRICS & GYNECOLOGY

## 2018-07-26 NOTE — MR AVS SNAPSHOT
After Visit Summary   7/26/2018    Lani Lewis    MRN: [id number]           Patient Information     Date Of Birth          1985        Visit Information        Provider Department      7/26/2018 10:45 AM Emeli Ordaz MD; LANGUAGE [facility]        Today's Diagnoses     Encounter for supervision of normal first pregnancy in second trimester    -  1    Gastroesophageal reflux disease without esophagitis        History of macrosomia in infant in prior pregnancy, currently pregnant        Need for Tdap vaccination           Follow-ups after your visit        Your next 10 appointments already scheduled     Aug 01, 2018  8:00 AM CDT   LAB with RD LAB   [facility] ([facility])    [address] [place] [address] 700  [facility] [id number] [phone]           Please do not eat 10-12 hours before your appointment if you are coming in fasting for labs on lipids, cholesterol, or glucose (sugar). This does not apply to pregnant women. Water, hot tea and black coffee (with nothing added) are okay. Do not drink other fluids, diet soda or chew gum.            Aug 01, 2018 11:45 AM CDT   MFM US COMP with URMFMUSR2   [facility] Ultrasound - [facility])    [address]  [facility] [id number] [phone]           Wear comfortable clothes and leave your valuables at home.            Aug 01, 2018 12:15 PM CDT   Radiology MD with UR JOANN AMBROSIO   [facility] Maternal Fetal [facility] - [facility])    [address]  [facility] [address]   [phone]           Please arrive at the time given for your first appointment. This visit is used internally to schedule the physician's time during your ultrasound.            Aug 22, 2018 10:30 AM CDT   ESTABLISHED PRENATAL with Emeli Ordaz MD   [facility] ([place]  "[facility]    [address] [place] [id number] [phone]              Future tests that were ordered for you today     Open Future Orders        Priority Expected Expires Ordered    Glucose tolerance gest std 100 gm 3 hr Routine  2018            Who to contact     If you have questions or need follow up information about today's clinic visit or your schedule please contact [facility] directly at [phone].  Normal or non-critical lab and imaging results will be communicated to you by MyChart, letter or phone within 4 business days after the clinic has received the results. If you do not hear from us within 7 days, please contact the clinic through [employer] or phone. If you have a critical or abnormal lab result, we will notify you by phone as soon as possible.  Submit refill requests through [employer] or call your pharmacy and they will forward the refill request to us. Please allow 3 business days for your refill to be completed.          Additional Information About Your Visit        First MarketingNatchaug HospitalMoonClerk Information     [employer] lets you send messages to your doctor, view your test results, renew your prescriptions, schedule appointments and more. To sign up, go to [email or web address] . Click on \"Log in\" on the left side of the screen, which will take you to the Welcome page. Then click on \"Sign up Now\" on the right side of the page.     You will be asked to enter the access code listed below, as well as some personal information. Please follow the directions to create your username and password.     Your access code is: [email or web address]  Expires: 10/16/2018 12:28 PM     Your access code will  in 90 days. If you need help or a new code, please call your [place] clinic or [phone].        Care EveryWhere ID     This is your Care EveryWhere ID. This could be used by other organizations to access your [place] medical records  [id number]      "   Your Vitals Were     Pulse Temperature BMI (Body Mass Index)             96 97.4  F (36.3  C) (Oral) 43.45 kg/m2          Blood Pressure from Last 3 Encounters:   07/26/18 105/69   07/18/18 98/72   02/08/18 114/66    Weight from Last 3 Encounters:   07/26/18 281 lb 9.6 oz (127.7 kg)   07/18/18 278 lb 6.4 oz (126.3 kg)   02/08/18 264 lb (119.7 kg)              We Performed the Following     TDAP VACCINE (BOOSTRIX)     VACCINE ADMINISTRATION, INITIAL          Today's Medication Changes          These changes are accurate as of 7/26/18 11:48 AM.  If you have any questions, ask your nurse or doctor.               Start taking these medicines.        Dose/Directions    ranitidine 150 MG tablet   Commonly known as:  ZANTAC   Used for:  Gastroesophageal reflux disease without esophagitis   Started by:  Emeli Ordaz MD        Dose:  150 mg   Take 1 tablet (150 mg) by mouth 2 times daily   Quantity:  120 tablet   Refills:  3            Where to get your medicines      These medications were sent to [facility] [address] [place] [address] [address] [place] [address]  [address] [facility] [address]    Hours:  24-hours Phone:  [phone]     ranitidine 150 MG tablet                Primary Care Provider Fax #    Physician No Ref-Primary [phone]       No address on file        Equal Access to Services     MARY VARGAS AH: Hadii herman evanso Solucy, waaxda luqadaha, qaybta kaalmada adeegyada, ekaterina dillard. So [facility] [phone].    ATENCIÓN: Si habla español, tiene a adam disposición servicios gratuitos de asistencia lingüística. Llame al [phone].    We comply with applicable federal civil rights laws and [place] laws. We do not discriminate on the basis of race, color, national origin, age, disability, sex, sexual orientation, or gender identity.            Thank you!     Thank you for choosing [facility]  for your  care. Our goal is always to provide you with excellent care. Hearing back from our patients is one way we can continue to improve our services. Please take a few minutes to complete the written survey that you may receive in the mail after your visit with us. Thank you!             Your Updated Medication List - Protect others around you: Learn how to safely use, store and throw away your medicines at www.disposemymeds.org.          This list is accurate as of 7/26/18 11:48 AM.  Always use your most recent med list.                   Brand Name Dispense Instructions for use Diagnosis    docusate sodium 100 MG capsule    COLACE     Take 100 mg by mouth        ferrous sulfate 325 (65 Fe) MG tablet    IRON     Take 325 mg by mouth        order for DME     1 Device    Equipment being ordered: tall CAm 9 1/2    Closed fracture of distal end of left fibula, unspecified fracture morphology, initial encounter       ranitidine 150 MG tablet    ZANTAC    120 tablet    Take 1 tablet (150 mg) by mouth 2 times daily    Gastroesophageal reflux disease without esophagitis       TRINATE Tabs

## 2018-07-26 NOTE — PROGRESS NOTES
"CC: New Ob visit  HPI: Lani Lewis is a 33 year old  here for new Ob visit.  No LMP recorded. Patient is pregnant..  She is 27w1d, giving her an EDC of 10/24/18.  She is transferring her care from [facility].  She reports that there has been no issues with her pregnancy thus far.  She had a anatomy survey done with PN, was told she needed a follow-up, so she is scheduled with MFM next week.    Her 3 most recent pregnancies were complicated by GDM.  She reports only monitoring BS and diet changes, no medication or insulin.  She has had 7 , reports her labors are slow, as is the second stage, but denies and problems with delivery, specifically she denies ever having a baby \"get stuck.\"  She denies ever being told that the shoulders got stuck.  Her largest child was 10lb, the other 6 were 8-9lbs.    Past Medical History:   Diagnosis Date     NO ACTIVE PROBLEMS        Past Surgical History:   Procedure Laterality Date     NO HISTORY OF SURGERY       Obstetric History       T7      L7     SAB0   TAB0   Ectopic0   Multiple0   Live Births7       # Outcome Date GA Lbr Kavon/2nd Weight Sex Delivery Anes PTL Lv   8 Current            7 Term 14 40w0d  9 lb (4.082 kg) F    NIELS   6 Term 12 40w0d  9 lb (4.082 kg) F    NIELS   5 Term 11/10/10 42w2d  9 lb 1.5 oz (4.125 kg) M   N NIELS   4 Term 08 40w0d  7 lb (3.175 kg) F   N NIELS   3 Term 07 40w0d  10 lb (4.536 kg) M   Y NIELS   2 Term 05 40w0d  8 lb (3.629 kg) M   N NIELS   1 Term 03 40w0d  8 lb (3.629 kg) F   N NIELS              Current Outpatient Prescriptions:      ranitidine (ZANTAC) 150 MG tablet, Take 1 tablet (150 mg) by mouth 2 times daily, Disp: 120 tablet, Rfl: 3     docusate sodium (COLACE) 100 MG capsule, Take 100 mg by mouth, Disp: , Rfl:      ferrous sulfate (IRON) 325 (65 Fe) MG tablet, Take 325 mg by mouth, Disp: , Rfl:      order for DME, Equipment being ordered: tall CAm , Disp: " 1 Device, Rfl: 0     Prenatal Vit-Fe Fumarate-FA (TRINATE) TABS, , Disp: , Rfl:     No Known Allergies    Family History   Problem Relation Age of Onset     Diabetes Father        Past medical, social, surgical and family history were reviewed and updated in EPIC.    ROS: No TIA's or unusual headaches, no dysphagia.  No prolonged cough. No dyspnea or chest pain on exertion.  No abdominal pain, change in bowel habits, black or bloody stools.  No urinary tract symptoms.  No new or unusual musculoskeletal symptoms.  Normal menses, no abnormal vaginal bleeding, discharge or unexpected pelvic pain. No new breast lumps, breast pain or nipple discharge.    PE: /69  Pulse 96  Temp 97.4  F (36.3  C) (Oral)  Wt 281 lb 9.6 oz (127.7 kg)  BMI 43.45 kg/m2    Gen: Healthy appearing female in no acute distress  Heart: RRR  Lungs: CTAB  Abd: +BS, SNT  Ex: No C/C/E, no suspicious rashes or lesions    Pelvic: deferred    A/P:  1) IUP at 27w1d, DANIELA from PN.  H/o GDM        PNL wnl.  She passed her glucola (103).  Given her obesity and h/o GDM, I asked if she would be willing to do the 3hr GTT and she readily agrees.  Will schedule.        Reviewed anticipated course of prenatal care        Reviewed recommendations for weight gain, activity and diet        Discussed MD call schedule as well as role of residents and med students both in clinic and hospital.  She is ok with resident care         tdap today       RTC 4 weeks    Emeli Ordaz MD

## 2018-07-31 ENCOUNTER — PRE VISIT (OUTPATIENT)
Dept: MATERNAL FETAL MEDICINE | Facility: CLINIC | Age: 33
End: 2018-07-31

## 2018-08-01 ENCOUNTER — HOSPITAL ENCOUNTER (OUTPATIENT)
Dept: ULTRASOUND IMAGING | Facility: CLINIC | Age: 33
Discharge: HOME | End: 2018-08-01
Attending: OBSTETRICS & GYNECOLOGY | Admitting: OBSTETRICS & GYNECOLOGY
Payer: COMMERCIAL

## 2018-08-01 ENCOUNTER — OFFICE VISIT (OUTPATIENT)
Dept: MATERNAL FETAL MEDICINE | Facility: CLINIC | Age: 33
End: 2018-08-01
Attending: OBSTETRICS & GYNECOLOGY
Payer: COMMERCIAL

## 2018-08-01 DIAGNOSIS — O09.299 HISTORY OF MACROSOMIA IN INFANT IN PRIOR PREGNANCY, CURRENTLY PREGNANT: ICD-10-CM

## 2018-08-01 DIAGNOSIS — O09.33 LATE PRENATAL CARE AFFECTING PREGNANCY IN THIRD TRIMESTER: ICD-10-CM

## 2018-08-01 DIAGNOSIS — O35.9XX0 SUSPECTED FETAL ANOMALY, ANTEPARTUM, SINGLE OR UNSPECIFIED FETUS: Primary | ICD-10-CM

## 2018-08-01 DIAGNOSIS — O26.90 PREGNANCY RELATED CONDITION, UNSPECIFIED TRIMESTER: ICD-10-CM

## 2018-08-01 LAB
GLUCOSE 1H P 100 G GLC PO SERPL-MCNC: 111 MG/DL (ref 60–179)
GLUCOSE 2H P 100 G GLC PO SERPL-MCNC: 96 MG/DL (ref 60–154)
GLUCOSE 3H P 100 G GLC PO SERPL-MCNC: 88 MG/DL (ref 60–139)
GLUCOSE P FAST SERPL-MCNC: 83 MG/DL (ref 60–94)

## 2018-08-01 PROCEDURE — 82951 GLUCOSE TOLERANCE TEST (GTT): CPT | Performed by: OBSTETRICS & GYNECOLOGY

## 2018-08-01 PROCEDURE — 36415 COLL VENOUS BLD VENIPUNCTURE: CPT | Performed by: OBSTETRICS & GYNECOLOGY

## 2018-08-01 PROCEDURE — 76811 OB US DETAILED SNGL FETUS: CPT

## 2018-08-01 PROCEDURE — 82952 GTT-ADDED SAMPLES: CPT | Performed by: OBSTETRICS & GYNECOLOGY

## 2018-08-01 NOTE — MR AVS SNAPSHOT
After Visit Summary   8/1/2018    Lani Lewis    MRN: [id number]           Patient Information     Date Of Birth          1985        Visit Information        Provider Department      8/1/2018 12:15 PM Abdulkadir Patino MD [facility] Maternal Fetal Medicine [facility]        Today's Diagnoses     Suspected fetal anomaly, antepartum, single or unspecified fetus    -  1    Late prenatal care affecting pregnancy in third trimester           Follow-ups after your visit        Your next 10 appointments already scheduled     Aug 22, 2018 10:30 AM CDT   ESTABLISHED PRENATAL with Emeli Ordaz MD   [facility]    [address] [place] [address] [phone]              Future tests that were ordered for you today     Open Future Orders        Priority Expected Expires Ordered    MFM US Comprehensive Single F/U Routine 8/22/2018 8/1/2019 8/1/2018            Who to contact     If you have questions or need follow up information about today's clinic visit or your schedule please contact [facility]ERNAL FETAL MEDIC[facility] directly at [phone].  Normal or non-critical lab and imaging results will be communicated to you by Smart Sparrowhart, letter or phone within 4 business days after the clinic has received the results. If you do not hear from us within 7 days, please contact the clinic through [employer] or phone. If you have a critical or abnormal lab result, we will notify you by phone as soon as possible.  Submit refill requests through [employer]de or call your pharmacy and they will forward the refill request to us. Please allow 3 business days for your refill to be completed.          Additional Information About Your Visit        [employer] Information     [employer] lets you send messages to your doctor, view your test results, renew your prescriptions, schedule appointments and more. To sign up, go to [email or web address] .  "Click on \"Log in\" on the left side of the screen, which will take you to the Welcome page. Then click on \"Sign up Now\" on the right side of the page.     You will be asked to enter the access code listed below, as well as some personal information. Please follow the directions to create your username and password.     Your access code is: [email or web address]  Expires: 10/16/2018 12:28 PM     Your access code will  in 90 days. If you need help or a new code, please call your [place] clinic or [phone].        Care EveryWhere ID     This is your Care EveryWhere ID. This could be used by other organizations to access your [place] medical records  [id number]        Your Vitals Were     Last Period                   2018            Blood Pressure from Last 3 Encounters:   18 105/69   18 98/72   18 114/66    Weight from Last 3 Encounters:   18 127.7 kg (281 lb 9.6 oz)   18 126.3 kg (278 lb 6.4 oz)   18 119.7 kg (264 lb)               Primary Care Provider Fax #    Physician No Ref-Primary [phone]       No address on file        Equal Access to Services     NIKA VARGAS : Hadii herman evanso Soburkeali, waaxda luqadaha, qaybta kaalmada adeegyada, ekaterina grant . So [facility] [phone].    ATENCIÓN: Si habla español, tiene a adam disposición servicios gratuitos de asistencia lingüística. Llame al [phone].    We comply with applicable federal civil rights laws and [place] laws. We do not discriminate on the basis of race, color, national origin, age, disability, sex, sexual orientation, or gender identity.            Thank you!     Thank you for choosing [facility]  for your care. Our goal is always to provide you with excellent care. Hearing back from our patients is one way we can continue to improve our services. Please take a few minutes to complete the written survey that you may receive in the mail after " your visit with us. Thank you!             Your Updated Medication List - Protect others around you: Learn how to safely use, store and throw away your medicines at www.disposemymeds.org.          This list is accurate as of 8/1/18 12:43 PM.  Always use your most recent med list.                   Brand Name Dispense Instructions for use Diagnosis    docusate sodium 100 MG capsule    COLACE     Take 100 mg by mouth        ferrous sulfate 325 (65 Fe) MG tablet    IRON     Take 325 mg by mouth        order for DME     1 Device    Equipment being ordered: tall CAm 9 1/2    Closed fracture of distal end of left fibula, unspecified fracture morphology, initial encounter       ranitidine 150 MG tablet    ZANTAC    120 tablet    Take 1 tablet (150 mg) by mouth 2 times daily    Gastroesophageal reflux disease without esophagitis       TRINATE Tabs

## 2018-08-01 NOTE — PROGRESS NOTES
"Please see \"Imaging\" tab under \"Chart Review\" for details of today's US at the [facility].    Abdulkadir Patino MD  Maternal-Fetal Medicine      "

## 2018-09-04 ENCOUNTER — PRENATAL OFFICE VISIT (OUTPATIENT)
Dept: OBGYN | Facility: CLINIC | Age: 33
End: 2018-09-04
Payer: COMMERCIAL

## 2018-09-04 VITALS
DIASTOLIC BLOOD PRESSURE: 72 MMHG | WEIGHT: 282 LBS | SYSTOLIC BLOOD PRESSURE: 105 MMHG | HEART RATE: 80 BPM | BODY MASS INDEX: 43.52 KG/M2 | TEMPERATURE: 97 F

## 2018-09-04 DIAGNOSIS — O09.299 HISTORY OF MACROSOMIA IN INFANT IN PRIOR PREGNANCY, CURRENTLY PREGNANT: Primary | ICD-10-CM

## 2018-09-04 PROCEDURE — 99207 ZZC PRENATAL VISIT: CPT | Performed by: OBSTETRICS & GYNECOLOGY

## 2018-09-04 RX ORDER — PRENATAL VIT,CAL 73/IRON/FOLIC 28 MG-1 MG
1 TABLET ORAL DAILY
Qty: 200 TABLET | Refills: 3 | Status: SHIPPED | OUTPATIENT
Start: 2018-09-04 | End: 2019-07-25

## 2018-09-04 NOTE — PROGRESS NOTES
33w3d feeling ok, some umbilical tenderness, discussed. Lots of mvmt.  Passed all 3 values on GTT. Has growth and f/u anatomy scan with MFM tomorrow.  Discussed importance of regular PNC visits.  RTC 2 weeks  du

## 2018-09-04 NOTE — MR AVS SNAPSHOT
After Visit Summary   9/4/2018    Lani Lewis    MRN: [id number]           Patient Information     Date Of Birth          1985        Visit Information        Provider Department      9/4/2018 10:30 AM Emeli Ordaz MD; LANGUAGE [facility]        Today's Diagnoses     History of macrosomia in infant in prior pregnancy, currently pregnant    -  1       Follow-ups after your visit        Your next 10 appointments already scheduled     Sep 05, 2018 10:15 AM CDT   MFM US COMPRE SINGLE F/U with [id number]   [facility])    [address]  [facility] [address] [phone]           Wear comfortable clothes and leave your valuables at home.            Sep 05, 2018 10:45 AM CDT   Radiology MD with UR JOANN AMBROSIO   [facility])    [address]  [facility] [address] [phone]           Please arrive at the time given for your first appointment. This visit is used internally to schedule the physician's time during your ultrasound.            Sep 19, 2018 10:45 AM CDT   ESTABLISHED PRENATAL with Emeli Ordaz MD   [facility])    [address] [place] [address] [phone]              Who to contact     If you have questions or need follow up information about today's clinic visit or your schedule please contact [facility] directly at [phone].  Normal or non-critical lab and imaging results will be communicated to you by MyChart, letter or phone within 4 business days after the clinic has received the results. If you do not hear from us within 7 days, please contact the clinic through MyChart or phone. If you have a critical or abnormal lab result, we will notify you by phone as soon as  possible.  Submit refill requests through [employer] or call your pharmacy and they will forward the refill request to us. Please allow 3 business days for your refill to be completed.          Additional Information About Your Visit        Care EveryWhere ID     This is your Care EveryWhere ID. This could be used by other organizations to access your [place] medical records  [id number]        Your Vitals Were     Pulse Temperature Last Period BMI (Body Mass Index)          80 97  F (36.1  C) (Oral) 01/06/2018 43.52 kg/m2         Blood Pressure from Last 3 Encounters:   09/04/18 105/72   07/26/18 105/69   07/18/18 98/72    Weight from Last 3 Encounters:   09/04/18 282 lb (127.9 kg)   07/26/18 281 lb 9.6 oz (127.7 kg)   07/18/18 278 lb 6.4 oz (126.3 kg)              Today, you had the following     No orders found for display         Today's Medication Changes          These changes are accurate as of 9/4/18 12:21 PM.  If you have any questions, ask your nurse or doctor.               These medicines have changed or have updated prescriptions.        Dose/Directions    TRINATE Tabs   This may have changed:    - how much to take  - when to take this   Used for:  History of macrosomia in infant in prior pregnancy, currently pregnant   Changed by:  Emeli Ordaz MD        Dose:  1 tablet   Take 1 tablet by mouth daily   Quantity:  200 tablet   Refills:  3            Where to get your medicines      These medications were sent to [id number] [address] [place] [address]  [address] [facility] [address]     Phone:  [phone]     TRINATE Tabs                Primary Care Provider Fax #    Physician No Ref-Primary [phone]       No address on file        Equal Access to Services     NIKA VARGAS : Chino Galvan, adrian nevarez, qaybta kaalmalauro britton, ekaterina dillard. So [facility]  [phone].    ATENCIÓN: Si leatha hutchins, tiene a adam disposición servicios gratuitos de asistencia lingüística. Glenn lafleur [phone].    We comply with applicable federal civil rights laws and [place] laws. We do not discriminate on the basis of race, color, national origin, age, disability, sex, sexual orientation, or gender identity.            Thank you!     Thank you for choosing [facility]  for your care. Our goal is always to provide you with excellent care. Hearing back from our patients is one way we can continue to improve our services. Please take a few minutes to complete the written survey that you may receive in the mail after your visit with us. Thank you!             Your Updated Medication List - Protect others around you: Learn how to safely use, store and throw away your medicines at www.disposemymeds.org.          This list is accurate as of 9/4/18 12:21 PM.  Always use your most recent med list.                   Brand Name Dispense Instructions for use Diagnosis    docusate sodium 100 MG capsule    COLACE     Take 100 mg by mouth        ferrous sulfate 325 (65 Fe) MG tablet    IRON     Take 325 mg by mouth        order for DME     1 Device    Equipment being ordered: tall CAm 9 1/2    Closed fracture of distal end of left fibula, unspecified fracture morphology, initial encounter       ranitidine 150 MG tablet    ZANTAC    120 tablet    Take 1 tablet (150 mg) by mouth 2 times daily    Gastroesophageal reflux disease without esophagitis       TRINATE Tabs     200 tablet    Take 1 tablet by mouth daily    History of macrosomia in infant in prior pregnancy, currently pregnant

## 2018-09-05 ENCOUNTER — HOSPITAL ENCOUNTER (OUTPATIENT)
Dept: ULTRASOUND IMAGING | Facility: CLINIC | Age: 33
Discharge: HOME | End: 2018-09-05
Attending: OBSTETRICS & GYNECOLOGY | Admitting: OBSTETRICS & GYNECOLOGY
Payer: COMMERCIAL

## 2018-09-05 ENCOUNTER — OFFICE VISIT (OUTPATIENT)
Dept: MATERNAL FETAL MEDICINE | Facility: CLINIC | Age: 33
End: 2018-09-05
Attending: OBSTETRICS & GYNECOLOGY
Payer: COMMERCIAL

## 2018-09-05 DIAGNOSIS — O35.9XX0 SUSPECTED FETAL ANOMALY, ANTEPARTUM, SINGLE OR UNSPECIFIED FETUS: ICD-10-CM

## 2018-09-05 DIAGNOSIS — O35.9XX0 SUSPECTED FETAL ANOMALY, ANTEPARTUM, SINGLE OR UNSPECIFIED FETUS: Primary | ICD-10-CM

## 2018-09-05 PROCEDURE — 76816 OB US FOLLOW-UP PER FETUS: CPT

## 2018-09-05 NOTE — PROGRESS NOTES
"Please see \"Imaging\" tab under \"Chart Review\" for details of today's US at the [facility].    Abdulkadir Patino MD  Maternal-Fetal Medicine      "

## 2018-09-05 NOTE — MR AVS SNAPSHOT
After Visit Summary   9/5/2018    Lani Lewis    MRN: [id number]           Patient Information     Date Of Birth          1985        Visit Information        Provider Department      9/5/2018 10:45 AM Abdulkadir Patino MD [facility] Maternal Fetal Medicine [facility]        Today's Diagnoses     Suspected fetal anomaly, antepartum, single or unspecified fetus    -  1       Follow-ups after your visit        Your next 10 appointments already scheduled     Sep 19, 2018 10:45 AM CDT   ESTABLISHED PRENATAL with Emeli Ordaz MD   [facility] ([facility])    [address]  [address]  [facility] [address]   [phone]            Oct 03, 2018 11:00 AM CDT   MFM US COMPRE SINGLE F/U with [id number]   [facility] Maternal Fetal Medicine Ultrasound - [facility])    [address]  [facility] [id number] [phone]           Wear comfortable clothes and leave your valuables at home.            Oct 03, 2018 11:30 AM CDT   Radiology MD with UR JOANN AMBROSIO   [facility] Maternal Fetal Medicine - [facility])    [address]  [facility] [id number] [phone]           Please arrive at the time given for your first appointment. This visit is used internally to schedule the physician's time during your ultrasound.              Future tests that were ordered for you today     Open Future Orders        Priority Expected Expires Ordered    MFM US Comprehensive Single F/U Routine 10/3/2018 9/5/2019 9/5/2018            Who to contact     If you have questions or need follow up information about today's clinic visit or your schedule please contact [facility] MATERNAL FETAL MEDICINE [facility] directly at [phone].  Normal or non-critical lab and imaging results will be communicated to you by MyChart, letter or phone within 4 business days after the clinic has  received the results. If you do not hear from us within 7 days, please contact the clinic through [employer] or phone. If you have a critical or abnormal lab result, we will notify you by phone as soon as possible.  Submit refill requests through [employer] or call your pharmacy and they will forward the refill request to us. Please allow 3 business days for your refill to be completed.          Additional Information About Your Visit        Care EveryWhere ID     This is your Care EveryWhere ID. This could be used by other organizations to access your [place] medical records  [id number]        Your Vitals Were     Last Period                   01/06/2018            Blood Pressure from Last 3 Encounters:   09/04/18 105/72   07/26/18 105/69   07/18/18 98/72    Weight from Last 3 Encounters:   09/04/18 127.9 kg (282 lb)   07/26/18 127.7 kg (281 lb 9.6 oz)   07/18/18 126.3 kg (278 lb 6.4 oz)               Primary Care Provider Fax #    Physician No Ref-Primary [phone]       No address on file        Equal Access to Services     NIKA VARGAS : Hadii herman villela hadasho Soomaali, waaxda luqadaha, qaybta kaalmada adeegyalauro, ekaterina grant . So [facility] [phone].    ATENCIÓN: Si habla español, tiene a adam disposición servicios gratuitos de asistencia lingüística. Llame al [phone].    We comply with applicable federal civil rights laws and [place] laws. We do not discriminate on the basis of race, color, national origin, age, disability, sex, sexual orientation, or gender identity.            Thank you!     Thank you for choosing [facility]  for your care. Our goal is always to provide you with excellent care. Hearing back from our patients is one way we can continue to improve our services. Please take a few minutes to complete the written survey that you may receive in the mail after your visit with us. Thank you!             Your Updated Medication List -  Protect others around you: Learn how to safely use, store and throw away your medicines at www.disposemymeds.org.          This list is accurate as of 9/5/18 11:13 AM.  Always use your most recent med list.                   Brand Name Dispense Instructions for use Diagnosis    docusate sodium 100 MG capsule    COLACE     Take 100 mg by mouth        ferrous sulfate 325 (65 Fe) MG tablet    IRON     Take 325 mg by mouth        order for DME     1 Device    Equipment being ordered: tall CAm 9 1/2    Closed fracture of distal end of left fibula, unspecified fracture morphology, initial encounter       ranitidine 150 MG tablet    ZANTAC    120 tablet    Take 1 tablet (150 mg) by mouth 2 times daily    Gastroesophageal reflux disease without esophagitis       TRINATE Tabs     200 tablet    Take 1 tablet by mouth daily    History of macrosomia in infant in prior pregnancy, currently pregnant

## 2018-09-19 ENCOUNTER — PRENATAL OFFICE VISIT (OUTPATIENT)
Dept: OBGYN | Facility: CLINIC | Age: 33
End: 2018-09-19
Payer: COMMERCIAL

## 2018-09-19 VITALS — WEIGHT: 284.5 LBS | SYSTOLIC BLOOD PRESSURE: 92 MMHG | DIASTOLIC BLOOD PRESSURE: 62 MMHG | BODY MASS INDEX: 43.9 KG/M2

## 2018-09-19 DIAGNOSIS — Z34.83 ENCOUNTER FOR SUPERVISION OF OTHER NORMAL PREGNANCY IN THIRD TRIMESTER: Primary | ICD-10-CM

## 2018-09-19 PROCEDURE — 99207 ZZC PRENATAL VISIT: CPT | Performed by: OBSTETRICS & GYNECOLOGY

## 2018-09-19 NOTE — MR AVS SNAPSHOT
After Visit Summary   9/19/2018    Lani Lewis    MRN: [id number]           Patient Information     Date Of Birth          1985        Visit Information        Provider Department      9/19/2018 10:30 AM Emeli Ordaz MD; LANGUAGE [facility]        Today's Diagnoses     Encounter for supervision of other normal pregnancy in third trimester    -  1       Follow-ups after your visit        Your next 10 appointments already scheduled     Oct 03, 2018 11:00 AM CDT   MFM US COMPRE SINGLE F/U with [id number]   [facility] [place] [facility])    [address]  [facility] [address] [phone]           Wear comfortable clothes and leave your valuables at home.            Oct 03, 2018 11:30 AM CDT   Radiology MD with UR JOANN AMBROSIO   [facility] - [facility])    [address]  [facility] [address] [phone]           Please arrive at the time given for your first appointment. This visit is used internally to schedule the physician's time during your ultrasound.            Oct 03, 2018 11:45 AM CDT   ESTABLISHED PRENATAL with Emeli Ordaz MD   [facility] ([facility])    [address] [place] [address] [phone]              Who to contact     If you have questions or need follow up information about today's clinic visit or your schedule please contact [facility] directly at [phone].  Normal or non-critical lab and imaging results will be communicated to you by MyChart, letter or phone within 4 business days after the clinic has received the results. If you do not hear from us within 7 days, please contact the clinic through MyChart or phone. If you have a critical or abnormal lab result, we will notify you by phone as soon as  possible.  Submit refill requests through [employer] or call your pharmacy and they will forward the refill request to us. Please allow 3 business days for your refill to be completed.          Additional Information About Your Visit        Care EveryWhere ID     This is your Care EveryWhere ID. This could be used by other organizations to access your [place] medical records  [id number]        Your Vitals Were     Last Period BMI (Body Mass Index)                01/06/2018 43.9 kg/m2           Blood Pressure from Last 3 Encounters:   09/19/18 92/62   09/04/18 105/72   07/26/18 105/69    Weight from Last 3 Encounters:   09/19/18 284 lb 8 oz (129 kg)   09/04/18 282 lb (127.9 kg)   07/26/18 281 lb 9.6 oz (127.7 kg)              Today, you had the following     No orders found for display       Primary Care Provider Fax #    Physician No Ref-Primary [phone]       No address on file        Equal Access to Services     NIKA VARGAS : Hadii herman Galvan, waaxda luqadaha, qaybta kaalmada ademarielayada, ekaterina grant . So [facility] [phone].    ATENCIÓN: Si habla español, tiene a adam disposición servicios gratuitos de asistencia lingüística. Llame al [phone].    We comply with applicable federal civil rights laws and [place] laws. We do not discriminate on the basis of race, color, national origin, age, disability, sex, sexual orientation, or gender identity.            Thank you!     Thank you for choosing [facility]  for your care. Our goal is always to provide you with excellent care. Hearing back from our patients is one way we can continue to improve our services. Please take a few minutes to complete the written survey that you may receive in the mail after your visit with us. Thank you!             Your Updated Medication List - Protect others around you: Learn how to safely use, store and throw away your medicines at www.disposemymeds.org.          This list  is accurate as of 9/19/18 12:18 PM.  Always use your most recent med list.                   Brand Name Dispense Instructions for use Diagnosis    docusate sodium 100 MG capsule    COLACE     Take 100 mg by mouth        ferrous sulfate 325 (65 Fe) MG tablet    IRON     Take 325 mg by mouth        order for DME     1 Device    Equipment being ordered: tall CAm 9 1/2    Closed fracture of distal end of left fibula, unspecified fracture morphology, initial encounter       ranitidine 150 MG tablet    ZANTAC    120 tablet    Take 1 tablet (150 mg) by mouth 2 times daily    Gastroesophageal reflux disease without esophagitis       TRINATE Tabs     200 tablet    Take 1 tablet by mouth daily    History of macrosomia in infant in prior pregnancy, currently pregnant

## 2018-09-19 NOTE — PROGRESS NOTES
35w4d Feeling good, no c/o.  Good fm.  Normal growth last us, has another scheduled in 1-2 weeks.  Will plan visit same day and do GBS.  She decline flu shot today, will do next visit.  du

## 2018-10-03 ENCOUNTER — PRENATAL OFFICE VISIT (OUTPATIENT)
Dept: OBGYN | Facility: CLINIC | Age: 33
End: 2018-10-03
Payer: COMMERCIAL

## 2018-10-03 VITALS
DIASTOLIC BLOOD PRESSURE: 62 MMHG | TEMPERATURE: 98.4 F | SYSTOLIC BLOOD PRESSURE: 92 MMHG | WEIGHT: 283.9 LBS | BODY MASS INDEX: 43.81 KG/M2

## 2018-10-03 DIAGNOSIS — Z34.83 ENCOUNTER FOR SUPERVISION OF OTHER NORMAL PREGNANCY IN THIRD TRIMESTER: Primary | ICD-10-CM

## 2018-10-03 LAB — HGB BLD-MCNC: 12.4 G/DL (ref 11.7–15.7)

## 2018-10-03 PROCEDURE — 99207 ZZC PRENATAL VISIT: CPT | Performed by: OBSTETRICS & GYNECOLOGY

## 2018-10-03 PROCEDURE — 00000218 ZZHCL STATISTIC OBHBG - HEMOGLOBIN: Performed by: OBSTETRICS & GYNECOLOGY

## 2018-10-03 PROCEDURE — 87653 STREP B DNA AMP PROBE: CPT | Performed by: OBSTETRICS & GYNECOLOGY

## 2018-10-03 PROCEDURE — 36416 COLLJ CAPILLARY BLOOD SPEC: CPT | Performed by: OBSTETRICS & GYNECOLOGY

## 2018-10-03 NOTE — LETTER
October 5, 2018      Lanimayito Lewis  [address] [facility] [address]        Dear ,    We are writing to inform you of your test results.    Your test results fall within the expected range(s) or remain unchanged from previous results.  Please continue with current treatment plan.    Resulted Orders   OB hemoglobin   Result Value Ref Range    Hemoglobin 12.4 11.7 - 15.7 g/dL   Group B strep PCR   Result Value Ref Range    Group B Strep PCR Spec [place] Vaginal Rectal     Group B Strep PCR Negative NEG^Negative      Comment:      Assay performed on incubated broth culture of specimen using [employer] real-time   PCR.         If you have any questions or concerns, please call the clinic at the number listed above.       Sincerely,        Codi Fallon MD

## 2018-10-03 NOTE — PROGRESS NOTES
37w4d  Feels some occ mild cramping, but no real contractions.  No vaginal bleeding or leakage of fluid.  + fetal movement  Had [facility] US scheduled for this morning, but had forgotten about it and had to cancel.  Recommended that she reschedule this US.  She reports her previous babies were 8-10+ pounds and she did have some difficulty with her deliveries.  Additionally, she states that this baby feels bigger than all her others.  We discussed the importance of knowing baby's size going in to delivery, as we sometimes recommend primary  if baby is particularly large due to concern for shoulder dystocia.  She will go downstairs to [facility] to reschedule her US at their next available.  GBS and hbg today.  Declines flu shot today; states she will get it next visit (said same thing last week).  RTC weekly.  Codi Fallon MD

## 2018-10-03 NOTE — MR AVS SNAPSHOT
After Visit Summary   10/3/2018    Lani Lewis    MRN: [id number]           Patient Information     Date Of Birth          1985        Visit Information        Provider Department      10/3/2018 11:45 AM Codi Fallon MD; PHONE,  [facility]        Today's Diagnoses     Encounter for supervision of other normal pregnancy in third trimester    -  1       Follow-ups after your visit        Your next 10 appointments already scheduled     Oct 04, 2018  9:30 AM CDT   MFM US COMPRE SINGLE F/U with [id number]   [facility] Ul[facility]ound - [facility])    [address]  [facility] [address] [phone]           Wear comfortable clothes and leave your valuables at home.            Oct 04, 2018 10:00 AM CDT   Radiology MD with UR JOANN AMBROSIO   [facility] - [facility])    [address]  [facility] [id number] [phone]           Please arrive at the time given for your first appointment. This visit is used internally to schedule the physician's time during your ultrasound.              Who to contact     If you have questions or need follow up information about today's clinic visit or your schedule please contact [facility] directly at [phone].  Normal or non-critical lab and imaging results will be communicated to you by MyChart, letter or phone within 4 business days after the clinic has received the results. If you do not hear from us within 7 days, please contact the clinic through MyChart or phone. If you have a critical or abnormal lab result, we will notify you by phone as soon as possible.  Submit refill requests through [employer] or call your pharmacy and they will forward the refill request to us. Please allow 3 business days for your refill to be completed.          Additional Information  About Your Visit        Care EveryWhere ID     This is your Care EveryWhere ID. This could be used by other organizations to access your [place] medical records  [id number]        Your Vitals Were     Temperature Last Period BMI (Body Mass Index)             98.4  F (36.9  C) (Oral) 01/06/2018 43.81 kg/m2          Blood Pressure from Last 3 Encounters:   10/03/18 92/62   09/19/18 92/62   09/04/18 105/72    Weight from Last 3 Encounters:   10/03/18 283 lb 14.4 oz (128.8 kg)   09/19/18 284 lb 8 oz (129 kg)   09/04/18 282 lb (127.9 kg)              We Performed the Following     Group B strep PCR     OB hemoglobin        Primary Care Provider Fax #    Physician No Ref-Primary [phone]       No address on file        Equal Access to Services     NIKA VARGAS : Chino Galvan, walisa nevarez, harlan kaalcelestino britton, ekaterina grant . So [facility] [phone].    ATENCIÓN: Si habla español, tiene a adam disposición servicios gratuitos de asistencia lingüística. Glenn al [phone].    We comply with applicable federal civil rights laws and [place] laws. We do not discriminate on the basis of race, color, national origin, age, disability, sex, sexual orientation, or gender identity.            Thank you!     Thank you for choosing [facility]  for your care. Our goal is always to provide you with excellent care. Hearing back from our patients is one way we can continue to improve our services. Please take a few minutes to complete the written survey that you may receive in the mail after your visit with us. Thank you!             Your Updated Medication List - Protect others around you: Learn how to safely use, store and throw away your medicines at www.disposemymeds.org.          This list is accurate as of 10/3/18  1:39 PM.  Always use your most recent med list.                   Brand Name Dispense Instructions for use Diagnosis    docusate sodium 100 MG  capsule    COLACE     Take 100 mg by mouth        ferrous sulfate 325 (65 Fe) MG tablet    IRON     Take 325 mg by mouth        order for DME     1 Device    Equipment being ordered: tall CAm 9 1/2    Closed fracture of distal end of left fibula, unspecified fracture morphology, initial encounter       ranitidine 150 MG tablet    ZANTAC    120 tablet    Take 1 tablet (150 mg) by mouth 2 times daily    Gastroesophageal reflux disease without esophagitis       TRINATE Tabs     200 tablet    Take 1 tablet by mouth daily    History of macrosomia in infant in prior pregnancy, currently pregnant

## 2018-10-04 ENCOUNTER — OFFICE VISIT (OUTPATIENT)
Dept: MATERNAL FETAL MEDICINE | Facility: CLINIC | Age: 33
End: 2018-10-04
Attending: OBSTETRICS & GYNECOLOGY
Payer: COMMERCIAL

## 2018-10-04 ENCOUNTER — HOSPITAL ENCOUNTER (OUTPATIENT)
Dept: ULTRASOUND IMAGING | Facility: CLINIC | Age: 33
Discharge: HOME | End: 2018-10-04
Attending: OBSTETRICS & GYNECOLOGY | Admitting: OBSTETRICS & GYNECOLOGY
Payer: COMMERCIAL

## 2018-10-04 DIAGNOSIS — O09.33 LATE PRENATAL CARE AFFECTING PREGNANCY IN THIRD TRIMESTER: Primary | ICD-10-CM

## 2018-10-04 DIAGNOSIS — O35.9XX0 SUSPECTED FETAL ANOMALY, ANTEPARTUM, SINGLE OR UNSPECIFIED FETUS: ICD-10-CM

## 2018-10-04 DIAGNOSIS — Z03.73 SUSPECTED FETAL ANOMALY NOT FOUND: ICD-10-CM

## 2018-10-04 LAB
GP B STREP DNA SPEC QL NAA+PROBE: NEGATIVE
SPECIMEN SOURCE: NORMAL

## 2018-10-04 PROCEDURE — 76816 OB US FOLLOW-UP PER FETUS: CPT

## 2018-10-04 NOTE — PROGRESS NOTES
"Please see \"Imaging\" tab under \"Chart Review\" for details of today's US.      Rubi Norton, DO  Maternal-Fetal Medicine        "

## 2018-10-04 NOTE — MR AVS SNAPSHOT
After Visit Summary   10/4/2018    Lani Lewis    MRN: [id number]           Patient Information     Date Of Birth          1985        Visit Information        Provider Department      10/4/2018 10:00 AM Rubi Norton, DO [facility] Maternal Fetal [facility]        Today's Diagnoses     Late prenatal care affecting pregnancy in third trimester    -  1    Suspected fetal anomaly not found           Follow-ups after your visit        Who to contact     If you have questions or need follow up information about today's clinic visit or your schedule please contact [facility] MATERNAL FETAL MEDICINE [facility] directly at [phone].  Normal or non-critical lab and imaging results will be communicated to you by MyChart, letter or phone within 4 business days after the clinic has received the results. If you do not hear from us within 7 days, please contact the clinic through MyChart or phone. If you have a critical or abnormal lab result, we will notify you by phone as soon as possible.  Submit refill requests through [employer] or call your pharmacy and they will forward the refill request to us. Please allow 3 business days for your refill to be completed.          Additional Information About Your Visit        Care EveryWhere ID     This is your Care EveryWhere ID. This could be used by other organizations to access your [place] medical records  [id number]        Your Vitals Were     Last Period                   01/06/2018            Blood Pressure from Last 3 Encounters:   10/03/18 92/62   09/19/18 92/62   09/04/18 105/72    Weight from Last 3 Encounters:   10/03/18 128.8 kg (283 lb 14.4 oz)   09/19/18 129 kg (284 lb 8 oz)   09/04/18 127.9 kg (282 lb)              Today, you had the following     No orders found for display       Primary Care Provider Fax #    Physician No Ref-Primary [phone]       No address on file        Equal Access to Services     NIKA VARGAS AH: Chino villela  salma Galvan, adrian yanceyarturoha, qacatarinata kamansi britton, ekaterina clairein hayaan juliamariela zakigabriel labethtaiwo gilma. So [facility] [phone].    ATENCIÓN: Si habla español, tiene a adam disposición servicios gratuitos de asistencia lingüística. Glenn al [phone].    We comply with applicable federal civil rights laws and [place] laws. We do not discriminate on the basis of race, color, national origin, age, disability, sex, sexual orientation, or gender identity.            Thank you!     Thank you for choosing [facility]  for your care. Our goal is always to provide you with excellent care. Hearing back from our patients is one way we can continue to improve our services. Please take a few minutes to complete the written survey that you may receive in the mail after your visit with us. Thank you!             Your Updated Medication List - Protect others around you: Learn how to safely use, store and throw away your medicines at www.disposemymeds.org.          This list is accurate as of 10/4/18 11:52 AM.  Always use your most recent med list.                   Brand Name Dispense Instructions for use Diagnosis    docusate sodium 100 MG capsule    COLACE     Take 100 mg by mouth        ferrous sulfate 325 (65 Fe) MG tablet    IRON     Take 325 mg by mouth        order for DME     1 Device    Equipment being ordered: tall CAm 9 1/2    Closed fracture of distal end of left fibula, unspecified fracture morphology, initial encounter       ranitidine 150 MG tablet    ZANTAC    120 tablet    Take 1 tablet (150 mg) by mouth 2 times daily    Gastroesophageal reflux disease without esophagitis       TRINATE Tabs     200 tablet    Take 1 tablet by mouth daily    History of macrosomia in infant in prior pregnancy, currently pregnant

## 2018-10-17 ENCOUNTER — PRENATAL OFFICE VISIT (OUTPATIENT)
Dept: OBGYN | Facility: CLINIC | Age: 33
End: 2018-10-17
Payer: COMMERCIAL

## 2018-10-17 VITALS — BODY MASS INDEX: 43.67 KG/M2 | DIASTOLIC BLOOD PRESSURE: 62 MMHG | WEIGHT: 283 LBS | SYSTOLIC BLOOD PRESSURE: 98 MMHG

## 2018-10-17 DIAGNOSIS — Z34.83 ENCOUNTER FOR SUPERVISION OF OTHER NORMAL PREGNANCY IN THIRD TRIMESTER: Primary | ICD-10-CM

## 2018-10-17 PROCEDURE — 99207 ZZC PRENATAL VISIT: CPT | Performed by: OBSTETRICS & GYNECOLOGY

## 2018-10-17 NOTE — MR AVS SNAPSHOT
After Visit Summary   10/17/2018    Lani Lewis    MRN: [id number]           Patient Information     Date Of Birth          1985        Visit Information        Provider Department      10/17/2018 11:00 AM Elinor Hussein MD; LANGUAGE [facility]        Today's Diagnoses     Encounter for supervision of other normal pregnancy in third trimester    -  1       Follow-ups after your visit        Follow-up notes from your care team     Return in about 1 week (around 10/24/2018).      Future tests that were ordered for you today     Open Future Orders        Priority Expected Expires Ordered    US OB >14 Weeks Follow Up Routine 10/24/2018 10/17/2019 10/17/2018            Who to contact     If you have questions or need follow up information about today's clinic visit or your schedule please contact [facility] directly at [phone].  Normal or non-critical lab and imaging results will be communicated to you by MyChart, letter or phone within 4 business days after the clinic has received the results. If you do not hear from us within 7 days, please contact the clinic through MyChart or phone. If you have a critical or abnormal lab result, we will notify you by phone as soon as possible.  Submit refill requests through ChangePanda or call your pharmacy and they will forward the refill request to us. Please allow 3 business days for your refill to be completed.          Additional Information About Your Visit        Care EveryWhere ID     This is your Care EveryWhere ID. This could be used by other organizations to access your [place] medical records  [id number]        Your Vitals Were     Last Period BMI (Body Mass Index)                01/06/2018 43.67 kg/m2           Blood Pressure from Last 3 Encounters:   10/17/18 98/62   10/03/18 92/62   09/19/18 92/62    Weight from Last 3 Encounters:   10/17/18 283 lb (128.4 kg)   10/03/18 283 lb 14.4 oz (128.8 kg)    09/19/18 284 lb 8 oz (129 kg)               Primary Care Provider Fax #    Physician No Ref-Primary [phone]       No address on file        Equal Access to Services     NIKA VARGAS : Hadii aad ku haddariela Galvan, adrian nevarez, harlan britton, ekaterina francetaiwo dillard. So [facility] [phone].    ATENCIÓN: Si habla español, tiene a adam disposición servicios gratuitos de asistencia lingüística. Llame al [phone].    We comply with applicable federal civil rights laws and [place] laws. We do not discriminate on the basis of race, color, national origin, age, disability, sex, sexual orientation, or gender identity.            Thank you!     Thank you for choosing [facility]  for your care. Our goal is always to provide you with excellent care. Hearing back from our patients is one way we can continue to improve our services. Please take a few minutes to complete the written survey that you may receive in the mail after your visit with us. Thank you!             Your Updated Medication List - Protect others around you: Learn how to safely use, store and throw away your medicines at www.disposemymeds.org.          This list is accurate as of 10/17/18 12:48 PM.  Always use your most recent med list.                   Brand Name Dispense Instructions for use Diagnosis    docusate sodium 100 MG capsule    COLACE     Take 100 mg by mouth        ferrous sulfate 325 (65 Fe) MG tablet    IRON     Take 325 mg by mouth        order for DME     1 Device    Equipment being ordered: tall CAm 9 1/2    Closed fracture of distal end of left fibula, unspecified fracture morphology, initial encounter       ranitidine 150 MG tablet    ZANTAC    120 tablet    Take 1 tablet (150 mg) by mouth 2 times daily    Gastroesophageal reflux disease without esophagitis       TRINATE Tabs     200 tablet    Take 1 tablet by mouth daily    History of macrosomia in infant in prior pregnancy,  currently pregnant

## 2018-10-17 NOTE — PROGRESS NOTES
Doing well,  Excellent movement.  EFW 75th%ile per MFM.  Cervix high, ballotable.  Recommend SUSAN and f/u visit.    Discussed management.  She is OK with IOL, wants an epidural.  LT

## 2018-10-24 ENCOUNTER — HOSPITAL ENCOUNTER (INPATIENT)
Facility: CLINIC | Age: 33
LOS: 3 days | Discharge: HOME HEALTH | End: 2018-10-27
Attending: OBSTETRICS & GYNECOLOGY | Admitting: OBSTETRICS & GYNECOLOGY
Payer: COMMERCIAL

## 2018-10-24 ENCOUNTER — PRENATAL OFFICE VISIT (OUTPATIENT)
Dept: OBGYN | Facility: CLINIC | Age: 33
End: 2018-10-24
Attending: OBSTETRICS & GYNECOLOGY
Payer: COMMERCIAL

## 2018-10-24 ENCOUNTER — RADIANT APPOINTMENT (OUTPATIENT)
Dept: ULTRASOUND IMAGING | Facility: CLINIC | Age: 33
End: 2018-10-24
Attending: OBSTETRICS & GYNECOLOGY
Payer: COMMERCIAL

## 2018-10-24 VITALS
HEIGHT: 68 IN | BODY MASS INDEX: 43.21 KG/M2 | SYSTOLIC BLOOD PRESSURE: 94 MMHG | TEMPERATURE: 97.6 F | DIASTOLIC BLOOD PRESSURE: 56 MMHG | OXYGEN SATURATION: 100 % | WEIGHT: 285.1 LBS

## 2018-10-24 DIAGNOSIS — Z34.83 ENCOUNTER FOR SUPERVISION OF OTHER NORMAL PREGNANCY IN THIRD TRIMESTER: Primary | ICD-10-CM

## 2018-10-24 DIAGNOSIS — K59.00 CONSTIPATION, UNSPECIFIED CONSTIPATION TYPE: Primary | ICD-10-CM

## 2018-10-24 DIAGNOSIS — O36.8130 DECREASED FETAL MOVEMENTS IN THIRD TRIMESTER, SINGLE OR UNSPECIFIED FETUS: ICD-10-CM

## 2018-10-24 DIAGNOSIS — Z34.83 ENCOUNTER FOR SUPERVISION OF OTHER NORMAL PREGNANCY IN THIRD TRIMESTER: ICD-10-CM

## 2018-10-24 LAB
BASOPHILS # BLD AUTO: 0 10E9/L (ref 0–0.2)
BASOPHILS NFR BLD AUTO: 0.1 %
DIFFERENTIAL METHOD BLD: ABNORMAL
EOSINOPHIL # BLD AUTO: 0.2 10E9/L (ref 0–0.7)
EOSINOPHIL NFR BLD AUTO: 2.1 %
ERYTHROCYTE [DISTWIDTH] IN BLOOD BY AUTOMATED COUNT: 13.3 % (ref 10–15)
HCT VFR BLD AUTO: 33.9 % (ref 35–47)
HGB BLD-MCNC: 10.9 G/DL (ref 11.7–15.7)
IMM GRANULOCYTES # BLD: 0 10E9/L (ref 0–0.4)
IMM GRANULOCYTES NFR BLD: 0.3 %
LYMPHOCYTES # BLD AUTO: 2 10E9/L (ref 0.8–5.3)
LYMPHOCYTES NFR BLD AUTO: 25.8 %
MCH RBC QN AUTO: 29.2 PG (ref 26.5–33)
MCHC RBC AUTO-ENTMCNC: 32.2 G/DL (ref 31.5–36.5)
MCV RBC AUTO: 91 FL (ref 78–100)
MONOCYTES # BLD AUTO: 0.3 10E9/L (ref 0–1.3)
MONOCYTES NFR BLD AUTO: 4 %
NEUTROPHILS # BLD AUTO: 5.3 10E9/L (ref 1.6–8.3)
NEUTROPHILS NFR BLD AUTO: 67.7 %
NRBC # BLD AUTO: 0 10*3/UL
NRBC BLD AUTO-RTO: 0 /100
PLATELET # BLD AUTO: 164 10E9/L (ref 150–450)
RBC # BLD AUTO: 3.73 10E12/L (ref 3.8–5.2)
WBC # BLD AUTO: 7.8 10E9/L (ref 4–11)

## 2018-10-24 PROCEDURE — 86900 BLOOD TYPING SEROLOGIC ABO: CPT | Performed by: STUDENT IN AN ORGANIZED HEALTH CARE EDUCATION/TRAINING PROGRAM

## 2018-10-24 PROCEDURE — 86901 BLOOD TYPING SEROLOGIC RH(D): CPT | Performed by: STUDENT IN AN ORGANIZED HEALTH CARE EDUCATION/TRAINING PROGRAM

## 2018-10-24 PROCEDURE — 59425 ANTEPARTUM CARE ONLY: CPT | Performed by: OBSTETRICS & GYNECOLOGY

## 2018-10-24 PROCEDURE — 86780 TREPONEMA PALLIDUM: CPT | Performed by: STUDENT IN AN ORGANIZED HEALTH CARE EDUCATION/TRAINING PROGRAM

## 2018-10-24 PROCEDURE — 85025 COMPLETE CBC W/AUTO DIFF WBC: CPT | Performed by: STUDENT IN AN ORGANIZED HEALTH CARE EDUCATION/TRAINING PROGRAM

## 2018-10-24 PROCEDURE — 99207 ZZC PRENATAL VISIT: CPT | Performed by: OBSTETRICS & GYNECOLOGY

## 2018-10-24 PROCEDURE — 12000032 ZZH R&B OB CRITICAL UMMC

## 2018-10-24 PROCEDURE — 59025 FETAL NON-STRESS TEST: CPT | Performed by: OBSTETRICS & GYNECOLOGY

## 2018-10-24 PROCEDURE — 76815 OB US LIMITED FETUS(S): CPT | Performed by: OBSTETRICS & GYNECOLOGY

## 2018-10-24 PROCEDURE — 25000132 ZZH RX MED GY IP 250 OP 250 PS 637: Performed by: STUDENT IN AN ORGANIZED HEALTH CARE EDUCATION/TRAINING PROGRAM

## 2018-10-24 PROCEDURE — 86850 RBC ANTIBODY SCREEN: CPT | Performed by: STUDENT IN AN ORGANIZED HEALTH CARE EDUCATION/TRAINING PROGRAM

## 2018-10-24 RX ORDER — TERBUTALINE SULFATE 1 MG/ML
0.25 INJECTION, SOLUTION SUBCUTANEOUS
Status: DISCONTINUED | OUTPATIENT
Start: 2018-10-24 | End: 2018-10-26

## 2018-10-24 RX ORDER — IBUPROFEN 800 MG/1
800 TABLET, FILM COATED ORAL
Status: DISCONTINUED | OUTPATIENT
Start: 2018-10-24 | End: 2018-10-26

## 2018-10-24 RX ORDER — FENTANYL CITRATE 50 UG/ML
50-100 INJECTION, SOLUTION INTRAMUSCULAR; INTRAVENOUS
Status: DISCONTINUED | OUTPATIENT
Start: 2018-10-24 | End: 2018-10-26

## 2018-10-24 RX ORDER — ONDANSETRON 2 MG/ML
4 INJECTION INTRAMUSCULAR; INTRAVENOUS EVERY 6 HOURS PRN
Status: DISCONTINUED | OUTPATIENT
Start: 2018-10-24 | End: 2018-10-26

## 2018-10-24 RX ORDER — HYDROXYZINE HYDROCHLORIDE 50 MG/1
100 TABLET, FILM COATED ORAL EVERY 6 HOURS PRN
Status: DISCONTINUED | OUTPATIENT
Start: 2018-10-24 | End: 2018-10-26

## 2018-10-24 RX ORDER — OXYTOCIN 10 [USP'U]/ML
10 INJECTION, SOLUTION INTRAMUSCULAR; INTRAVENOUS
Status: DISCONTINUED | OUTPATIENT
Start: 2018-10-24 | End: 2018-10-26

## 2018-10-24 RX ORDER — OXYTOCIN/0.9 % SODIUM CHLORIDE 30/500 ML
100-340 PLASTIC BAG, INJECTION (ML) INTRAVENOUS CONTINUOUS PRN
Status: DISCONTINUED | OUTPATIENT
Start: 2018-10-24 | End: 2018-10-26

## 2018-10-24 RX ORDER — CARBOPROST TROMETHAMINE 250 UG/ML
250 INJECTION, SOLUTION INTRAMUSCULAR
Status: DISCONTINUED | OUTPATIENT
Start: 2018-10-24 | End: 2018-10-26

## 2018-10-24 RX ORDER — LIDOCAINE 40 MG/G
CREAM TOPICAL
Status: DISCONTINUED | OUTPATIENT
Start: 2018-10-24 | End: 2018-10-25

## 2018-10-24 RX ORDER — METHYLERGONOVINE MALEATE 0.2 MG/ML
200 INJECTION INTRAVENOUS
Status: DISCONTINUED | OUTPATIENT
Start: 2018-10-24 | End: 2018-10-26

## 2018-10-24 RX ORDER — HYDROXYZINE HYDROCHLORIDE 50 MG/1
50 TABLET, FILM COATED ORAL EVERY 6 HOURS PRN
Status: DISCONTINUED | OUTPATIENT
Start: 2018-10-24 | End: 2018-10-26

## 2018-10-24 RX ORDER — SODIUM CHLORIDE, SODIUM LACTATE, POTASSIUM CHLORIDE, CALCIUM CHLORIDE 600; 310; 30; 20 MG/100ML; MG/100ML; MG/100ML; MG/100ML
INJECTION, SOLUTION INTRAVENOUS CONTINUOUS
Status: DISCONTINUED | OUTPATIENT
Start: 2018-10-24 | End: 2018-10-26

## 2018-10-24 RX ORDER — MISOPROSTOL 100 UG/1
25 TABLET ORAL
Status: DISCONTINUED | OUTPATIENT
Start: 2018-10-24 | End: 2018-10-26

## 2018-10-24 RX ORDER — ACETAMINOPHEN 325 MG/1
650 TABLET ORAL EVERY 4 HOURS PRN
Status: DISCONTINUED | OUTPATIENT
Start: 2018-10-24 | End: 2018-10-26

## 2018-10-24 RX ORDER — LIDOCAINE 40 MG/G
CREAM TOPICAL
Status: DISCONTINUED | OUTPATIENT
Start: 2018-10-24 | End: 2018-10-26

## 2018-10-24 RX ORDER — CITRIC ACID/SODIUM CITRATE 334-500MG
30 SOLUTION, ORAL ORAL ONCE
Status: DISCONTINUED | OUTPATIENT
Start: 2018-10-24 | End: 2018-10-26

## 2018-10-24 RX ORDER — OXYCODONE AND ACETAMINOPHEN 5; 325 MG/1; MG/1
1 TABLET ORAL
Status: DISCONTINUED | OUTPATIENT
Start: 2018-10-24 | End: 2018-10-26

## 2018-10-24 RX ORDER — NALOXONE HYDROCHLORIDE 0.4 MG/ML
.1-.4 INJECTION, SOLUTION INTRAMUSCULAR; INTRAVENOUS; SUBCUTANEOUS
Status: DISCONTINUED | OUTPATIENT
Start: 2018-10-24 | End: 2018-10-26

## 2018-10-24 RX ADMIN — RANITIDINE 150 MG: 150 TABLET ORAL at 23:45

## 2018-10-24 RX ADMIN — Medication 25 MCG: at 23:46

## 2018-10-24 ASSESSMENT — ACTIVITIES OF DAILY LIVING (ADL)
TRANSFERRING: 0-->INDEPENDENT
BATHING: 0-->INDEPENDENT
DRESS: 0-->INDEPENDENT
COGNITION: 0 - NO COGNITION ISSUES REPORTED
RETIRED_EATING: 0-->INDEPENDENT
RETIRED_COMMUNICATION: 0-->UNDERSTANDS/COMMUNICATES WITHOUT DIFFICULTY
SWALLOWING: 0-->SWALLOWS FOODS/LIQUIDS WITHOUT DIFFICULTY
AMBULATION: 0-->INDEPENDENT
TOILETING: 0-->INDEPENDENT
FALL_HISTORY_WITHIN_LAST_SIX_MONTHS: NO

## 2018-10-24 NOTE — IP AVS SNAPSHOT
MRN:[id number]                      After Visit Summary   10/24/2018    Lani Lewis    MRN: [id number]           Thank you!     Thank you for choosing [place] for your care. Our goal is always to provide you with excellent care. Hearing back from our patients is one way we can continue to improve our services. Please take a few minutes to complete the written survey that you may receive in the mail after you visit with us. Thank you!        Patient Information     Date Of Birth          1985        Designated Caregiver       Most Recent Value    Caregiver    Will someone help with your care after discharge? no    Name of designated caregiver self    Phone number of caregiver n/a      About your hospital stay     You were [facility] on:  October 24[facility]    You were discharged on:  October 27, 2018        Reason for your hospital stay       Maternity care                  Who to Call     For medical emergencies, please call 911.  For non-urgent questions about your medical care, please call your primary care provider or clinic, [phone]          Attending Provider     Provider Specialty    Codi Pickering MD OB/Gyn    Staci Curtis MD OB/Gyn       Primary Care Provider Office Phone # Fax #    [facility] [phone]      After Care Instructions     Activity       Review discharge instructions            Diet       Resume previous diet            Discharge Instructions       Call your health care provider if you have any of the following: Fever above 100.4 F; opening or drainage from your incision; soaking a sanitary pad with blood within 1 hour, or you see blood clots larger than a golf ball; malodorous vaginal discharge, severe or worsening pain uncontrolled by your pain medications, nausea and vomiting, severe headaches, changes in vision, calf swelling or pain, shortness of breath, problems  coping with sadness, anxiety, or depression.  If you have any concerns about hurting yourself or the baby, call your provider immediately. You are encouraged to call with questions or concerns after you return home.            Discharge Instructions - Postpartum visit       Schedule postpartum visit with your provider and return to clinic in 6 weeks with Nexplanon insertion.                  Follow-up Appointments     Follow Up and recommended labs and tests       Follow up in 6 weeks for postpartum visit.                  Further instructions from your care team       Vaginal Delivery Discharge Instructions: Kosovan  Waxqabadka:     Waydiiso qoyska iyo saaxibadaa inay ku caawiyaan markaad u baahantahay.    Waxba dyer kor saarin ama dyer galin farjigaaga ilaa uu dhakhtarkaagu ansixiyo.    Si fudud u qaado dhowrka asbuuc e xiga si aad u ogalaato in [facility] ngoc kabto. Waxaad samayn kartaa howl kasta oo aad rabto ilaa meeshaas laga gaarayo.    Gaadhi dyer jesús markaad qaadanayso  kaniiniyada xanuunka ee dhkhatarku kuu qoray . waxaad gaadhi wadi kartaa haddii aad qaadanayso kaniiniyada xanuunka ee koontarka.    Wac bixiyahaaga daryeelka caafimaaad haddii aad qabtid mid ka mid ah calaamadahan ngoc socda:    Haddii suufka dhiigga nuuga uu ku buuxsamo 1 saac gudihiis, ama aad aragto xinjiro dhiig ah oo ka wayn kubadda golafka.     Dhiig soconaya wax kabadan 6 asbuuc.    Haddii aad qabto dheecaan farjiga ka imaanaya oo  si xun u uraya.     Qandho  100.4  F (38  C) am aka sii saraysa (heerkulka laga qaaday carabka hoostiiisa), oo qarqaryo leh ama aan lahayn     Xanuun, nabar, majiirid daran oo aad ka dareeto qaybta hoose ee ubucda.    Keeuusegundo dumont guduudasho ama dheecaan ka dhiimaya meesha la tolay ee qaliinka.    Yesicaadi badsalena oo dagdag gilma oo markasta ku qabanaysa , ama mark anthonybasho aad dareento markaad kaajayso.    segundo Handy, ama adánnuutaiwo aad ka dareento xididada lugta.    Dhibaato kaa haysata naas nuujinta, ama  guduudasho ama xanuun naaska ah.    Xanuun sii kordhaya ama aan ka dhamaanayn meesha la tolay ama danqashada dilaaca.    Lalabbo ama matag.    Xabad xanuun iyo qufac ama naqaska oo kugu dhagaya.    Dhibaato ay la socoto muryany hummel, aa gifty.     Haddii aad qabtid wax walwal ah oo ku saabsan inaad waxyeelayso naftaada ama ilmaha, wac dhakhtarka aman markiiba.     Haddii aad qabto adam aalo ama walwal markaad guriga ku noqoto kadib.    Gacmahaagga nadiifi:  Markasta dhaqa gacahaaga ka hor inta aadan taaban farjiga agagaarkiisa  iyo meesha la tolay. Joshua waxay caawiniaysaa inuu yaraado infakshanku. Hdii gacmahaagu aysataiwo wasakcarrie sheriff isticmaali kartaa aalkalo aad gacmaha ku tirtirto si aad u nadiifiso gacmahaaga. Cidiyahaaga nadiifi ooo jar.      Vaginal Delivery Discharge Instructions  Activity:     Ask family and friends for help when you need it.    Do not place anything in your vagina until your doctor approves.    Take it easy for the next few weeks to allow your body to recover. You may do any activities you feel up to at that point.    Do not drive while taking pain pills prescribed by your doctor. You may drive if taking over-the-counter pain pills.    Call your health care provider if you have any of these symptoms:    You soak a sanitary pad with blood within 1 hour, or you see blood clots larger than a golf ball.    Bleeding that lasts more than 6 weeks.    You have vaginal discharge that smells bad.     A fever of 100.4  F (38  C) or higher (temperature taken under your tongue), with or without chills     Severe, pain, cramping or tenderness in your lower belly area.    Increased pain, swelling, redness or fluid around your stitches.    A more frequent or urgent need to urinate (pee), or it burns when you pee.    Redness, swelling or pain around a vein in your leg.    Problems breastfeeding, or a red or painful area on your breast.    Pain that increases or does not go away from an episiotomy or  perineal tear.    Nausea and vomiting.    Chest pain and cough or are gasping for air.    Problems coping with sadness, anxiety, or depression.     If you have any concerns about hurting yourself or the baby, call your doctor right away.      You have questions or concerns after you return home.    Keep your hands clean:  Always wash your hands before touching your perineal area and stitches.  This helps reduce your risk of infection.  If your hands aren t dirty, you may use an alcohol hand-rub to clean your hands. Keep your nails clean and short.        Pending Results     No orders found from 10/22/2018 to 10/25/2018.            Statement of Approval     Ordered          10/27/18 0910  I have reviewed and agree with all the recommendations and orders detailed in this document.  EFFECTIVE NOW     Approved and electronically signed by:  Daly Jacobs MD             Admission Information     Date & Time Provider Department Dept. Phone    10/24/2018 Staci Curtis MD [facility] [phone]      Your Vitals Were     Blood Pressure Temperature Respirations Last Period Pulse Oximetry       103/58 97.7  F (36.5  C) (Oral) 18 01/06/2018 85%       Care EveryWhere ID     This is your Care EveryWhere ID. This could be used by other organizations to access your [place] medical records  [id number]        Equal Access to Services     NIKA VARGAS AH: Hadii aad ku hadasho Soburkeali, waaxda luqadaha, qaybta kaalmada adeegyada, ekaterina dillard. So [facility] [phone].    ATENCIÓN: Si habla español, tiene a adma disposición servicios gratuitos de asistencia lingüística. Llame al [phone].    We comply with applicable federal civil rights laws and [place] laws. We do not discriminate on the basis of race, color, national origin, age, disability, sex, sexual orientation, or gender identity.               Review of your medicines      START taking        Dose / Directions    ibuprofen 600 MG tablet    Commonly known as:  ADVIL/MOTRIN        Dose:  600 mg   Take 1 tablet (600 mg) by mouth every 6 hours as needed for other (cramping)   Quantity:  30 tablet   Refills:  0       senna-docusate 8.6-50 MG per tablet   Commonly known as:  SENOKOT-S;PERICOLACE   Used for:  Constipation, unspecified constipation type        Dose:  2 tablet   Take 2 tablets by mouth 2 times daily as needed for constipation   Quantity:  60 tablet   Refills:  0         CONTINUE these medicines which have NOT CHANGED        Dose / Directions    docusate sodium 100 MG capsule   Commonly known as:  COLACE        Dose:  100 mg   Take 100 mg by mouth   Refills:  0       order for DME   Used for:  Closed fracture of distal end of left fibula, unspecified fracture morphology, initial encounter        Equipment being ordered: tall CAm 9 1/2   Quantity:  1 Device   Refills:  0       TRINATE Tabs   Used for:  History of macrosomia in infant in prior pregnancy, currently pregnant        Dose:  1 tablet   Take 1 tablet by mouth daily   Quantity:  200 tablet   Refills:  3         STOP taking     ferrous sulfate 325 (65 Fe) MG tablet   Commonly known as:  IRON           ranitidine 150 MG tablet   Commonly known as:  ZANTAC                Where to get your medicines      These medications were sent to [facility] [place], [place] - [address]  [address] [address]     Phone:  [phone]     ibuprofen 600 MG tablet    senna-docusate 8.6-50 MG per tablet                Protect others around you: Learn how to safely use, store and throw away your medicines at www.disposemymeds.org.             Medication List: This is a list of all your medications and when to take them. Check marks below indicate your daily home schedule. Keep this list as a reference.      Medications           Morning Afternoon Evening Bedtime As Needed    docusate sodium 100 MG capsule   Commonly known as:  COLACE   Take 100 mg by mouth                                 ibuprofen 600 MG tablet   Commonly known as:  ADVIL/MOTRIN   Take 1 tablet (600 mg) by mouth every 6 hours as needed for other (cramping)   Last time this was given:  800 mg on 10/27/2018 11:04 AM                                order for DME   Equipment being ordered: tall CAm 9 1/2                                senna-docusate 8.6-50 MG per tablet   Commonly known as:  SENOKOT-S;PERICOLACE   Take 2 tablets by mouth 2 times daily as needed for constipation   Last time this was given:  1 tablet on 10/27/2018  7:58 AM                                TRINATE Tabs   Take 1 tablet by mouth daily

## 2018-10-24 NOTE — IP AVS SNAPSHOT
[facility]    [address] [address]    Phone:  [phone]                                       After Visit Summary   10/24/2018    Lani Lewis    MRN: [id number]           After Visit Summary Signature Page     I have received my discharge instructions, and my questions have been answered. I have discussed any challenges I see with this plan with the nurse or doctor.    ..........................................................................................................................................  Patient/Patient Representative Signature      ..........................................................................................................................................  Patient Representative Print Name and Relationship to Patient    ..................................................               ................................................  Date                                   Time    ..........................................................................................................................................  Reviewed by Signature/Title    ...................................................              ..............................................  Date                                               Time          22EPIC Rev 08/18

## 2018-10-24 NOTE — MR AVS SNAPSHOT
"              After Visit Summary   10/24/2018    Lani Lewis    MRN: [id number]           Patient Information     Date Of Birth          1985        Visit Information        Provider Department      10/24/2018 10:00 AM Shelly Shepherd MD; LANGUAGE [facility]        Today's Diagnoses     Encounter for supervision of other normal pregnancy in third trimester    -  1    Decreased fetal movements in third trimester, single or unspecified fetus           Follow-ups after your visit        Who to contact     If you have questions or need follow up information about today's clinic visit or your schedule please contact [facility] directly at [phone].  Normal or non-critical lab and imaging results will be communicated to you by MyChart, letter or phone within 4 business days after the clinic has received the results. If you do not hear from us within 7 days, please contact the clinic through MyChart or phone. If you have a critical or abnormal lab result, we will notify you by phone as soon as possible.  Submit refill requests through ALTILIA or call your pharmacy and they will forward the refill request to us. Please allow 3 business days for your refill to be completed.          Additional Information About Your Visit        Care EveryWhere ID     This is your Care EveryWhere ID. This could be used by other organizations to access your [place] medical records  [id number]        Your Vitals Were     Temperature Height Last Period Pulse Oximetry BMI (Body Mass Index)       97.6  F (36.4  C) (Oral) 5' 7.5\" (1.715 m) 01/06/2018 100% 43.99 kg/m2        Blood Pressure from Last 3 Encounters:   10/24/18 94/56   10/17/18 98/62   10/03/18 92/62    Weight from Last 3 Encounters:   10/24/18 285 lb 1.6 oz (129.3 kg)   10/17/18 283 lb (128.4 kg)   10/03/18 283 lb 14.4 oz (128.8 kg)              We Performed the Following     FETAL NON-STRESS TEST        Primary Care Provider Fax " #    Physician No Ref-Primary [phone]       No address on file        Equal Access to Services     NIKA ALICIA : Hadii aad ku hadmarkmayito Amiralucy, waesmeda bcarturoha, qacatarinata sydniemansi juliacurtlauro, ekaterina wooten ruiztaiwo dumontmariela wallace juanita dillard. So [facility] [phone].    ATENCIÓN: Si habla español, tiene a adam disposición servicios gratuitos de asistencia lingüística. Llame al [phone].    We comply with applicable federal civil rights laws and [place] laws. We do not discriminate on the basis of race, color, national origin, age, disability, sex, sexual orientation, or gender identity.            Thank you!     Thank you for choosing [facility]  for your care. Our goal is always to provide you with excellent care. Hearing back from our patients is one way we can continue to improve our services. Please take a few minutes to complete the written survey that you may receive in the mail after your visit with us. Thank you!             Your Updated Medication List - Protect others around you: Learn how to safely use, store and throw away your medicines at www.disposemymeds.org.          This list is accurate as of 10/24/18  1:41 PM.  Always use your most recent med list.                   Brand Name Dispense Instructions for use Diagnosis    docusate sodium 100 MG capsule    COLACE     Take 100 mg by mouth        ferrous sulfate 325 (65 Fe) MG tablet    IRON     Take 325 mg by mouth        order for DME     1 Device    Equipment being ordered: tall CAm 9 1/2    Closed fracture of distal end of left fibula, unspecified fracture morphology, initial encounter       ranitidine 150 MG tablet    ZANTAC    120 tablet    Take 1 tablet (150 mg) by mouth 2 times daily    Gastroesophageal reflux disease without esophagitis       TRINATE Tabs     200 tablet    Take 1 tablet by mouth daily    History of macrosomia in infant in prior pregnancy, currently pregnant

## 2018-10-24 NOTE — PROGRESS NOTES
40w4d  Patient complains of less fetal movement for the past several days.  US today done for SUSAN check.  SUSAN at 24.2 cm, cephalic.  Polyhydramnios.   NST done ~ EFM:   135 baseline, moderate variability  with intermittent periods of minimal variability , + accels but only a few accels after prolonged monitoring.   One deceleration with a contraction.  toco -- ctx's q 5-10 min.  Cervix closed.  Borderline NST with decreased FM and poly. EFW ~ 9 lbs  Normal GTT.    Induction recommended. Patient will come back this evening for induction.  RR

## 2018-10-25 ENCOUNTER — ANESTHESIA EVENT (OUTPATIENT)
Dept: OBGYN | Facility: CLINIC | Age: 33
End: 2018-10-25
Payer: COMMERCIAL

## 2018-10-25 ENCOUNTER — ANESTHESIA (OUTPATIENT)
Dept: OBGYN | Facility: CLINIC | Age: 33
End: 2018-10-25
Payer: COMMERCIAL

## 2018-10-25 LAB
ABO + RH BLD: NORMAL
ABO + RH BLD: NORMAL
BLD GP AB SCN SERPL QL: NORMAL
BLOOD BANK CMNT PATIENT-IMP: NORMAL
SPECIMEN EXP DATE BLD: NORMAL
T PALLIDUM AB SER QL: NONREACTIVE

## 2018-10-25 PROCEDURE — 25000128 H RX IP 250 OP 636: Performed by: ANESTHESIOLOGY

## 2018-10-25 PROCEDURE — 25000128 H RX IP 250 OP 636: Performed by: STUDENT IN AN ORGANIZED HEALTH CARE EDUCATION/TRAINING PROGRAM

## 2018-10-25 PROCEDURE — 12000032 ZZH R&B OB CRITICAL UMMC

## 2018-10-25 PROCEDURE — 25000125 ZZHC RX 250: Performed by: STUDENT IN AN ORGANIZED HEALTH CARE EDUCATION/TRAINING PROGRAM

## 2018-10-25 PROCEDURE — 25000132 ZZH RX MED GY IP 250 OP 250 PS 637

## 2018-10-25 PROCEDURE — 25000132 ZZH RX MED GY IP 250 OP 250 PS 637: Performed by: STUDENT IN AN ORGANIZED HEALTH CARE EDUCATION/TRAINING PROGRAM

## 2018-10-25 PROCEDURE — 3E0R3BZ INTRODUCTION OF ANESTHETIC AGENT INTO SPINAL CANAL, PERCUTANEOUS APPROACH: ICD-10-PCS | Performed by: ANESTHESIOLOGY

## 2018-10-25 PROCEDURE — 00HU33Z INSERTION OF INFUSION DEVICE INTO SPINAL CANAL, PERCUTANEOUS APPROACH: ICD-10-PCS | Performed by: ANESTHESIOLOGY

## 2018-10-25 PROCEDURE — 10907ZC DRAINAGE OF AMNIOTIC FLUID, THERAPEUTIC FROM PRODUCTS OF CONCEPTION, VIA NATURAL OR ARTIFICIAL OPENING: ICD-10-PCS | Performed by: OBSTETRICS & GYNECOLOGY

## 2018-10-25 RX ORDER — LIDOCAINE 40 MG/G
CREAM TOPICAL
Status: DISCONTINUED | OUTPATIENT
Start: 2018-10-25 | End: 2018-10-26

## 2018-10-25 RX ORDER — OXYTOCIN 10 [USP'U]/ML
INJECTION, SOLUTION INTRAMUSCULAR; INTRAVENOUS
Status: DISCONTINUED
Start: 2018-10-25 | End: 2018-10-26 | Stop reason: HOSPADM

## 2018-10-25 RX ORDER — NALBUPHINE HYDROCHLORIDE 10 MG/ML
2.5-5 INJECTION, SOLUTION INTRAMUSCULAR; INTRAVENOUS; SUBCUTANEOUS EVERY 6 HOURS PRN
Status: DISCONTINUED | OUTPATIENT
Start: 2018-10-25 | End: 2018-10-27 | Stop reason: HOSPADM

## 2018-10-25 RX ORDER — MISOPROSTOL 200 UG/1
TABLET ORAL
Status: DISCONTINUED
Start: 2018-10-25 | End: 2018-10-26 | Stop reason: HOSPADM

## 2018-10-25 RX ORDER — TERBUTALINE SULFATE 1 MG/ML
0.25 INJECTION, SOLUTION SUBCUTANEOUS
Status: DISCONTINUED | OUTPATIENT
Start: 2018-10-25 | End: 2018-10-26

## 2018-10-25 RX ORDER — EPHEDRINE SULFATE 50 MG/ML
5 INJECTION, SOLUTION INTRAMUSCULAR; INTRAVENOUS; SUBCUTANEOUS
Status: DISCONTINUED | OUTPATIENT
Start: 2018-10-25 | End: 2018-10-27 | Stop reason: HOSPADM

## 2018-10-25 RX ORDER — LIDOCAINE HYDROCHLORIDE 10 MG/ML
INJECTION, SOLUTION INFILTRATION; PERINEURAL
Status: DISCONTINUED
Start: 2018-10-25 | End: 2018-10-26 | Stop reason: HOSPADM

## 2018-10-25 RX ORDER — BUPIVACAINE HYDROCHLORIDE 2.5 MG/ML
INJECTION, SOLUTION EPIDURAL; INFILTRATION; INTRACAUDAL PRN
Status: DISCONTINUED | OUTPATIENT
Start: 2018-10-25 | End: 2018-10-26 | Stop reason: HOSPADM

## 2018-10-25 RX ORDER — NALOXONE HYDROCHLORIDE 0.4 MG/ML
.1-.4 INJECTION, SOLUTION INTRAMUSCULAR; INTRAVENOUS; SUBCUTANEOUS
Status: DISCONTINUED | OUTPATIENT
Start: 2018-10-25 | End: 2018-10-26

## 2018-10-25 RX ORDER — OXYTOCIN/0.9 % SODIUM CHLORIDE 30/500 ML
PLASTIC BAG, INJECTION (ML) INTRAVENOUS
Status: DISCONTINUED
Start: 2018-10-25 | End: 2018-10-26 | Stop reason: HOSPADM

## 2018-10-25 RX ORDER — FENTANYL/BUPIVACAINE/NS/PF 2-1250MCG
PLASTIC BAG, INJECTION (ML) INJECTION
Status: COMPLETED
Start: 2018-10-25 | End: 2018-10-25

## 2018-10-25 RX ORDER — SODIUM CHLORIDE, SODIUM LACTATE, POTASSIUM CHLORIDE, CALCIUM CHLORIDE 600; 310; 30; 20 MG/100ML; MG/100ML; MG/100ML; MG/100ML
INJECTION, SOLUTION INTRAVENOUS CONTINUOUS
Status: DISCONTINUED | OUTPATIENT
Start: 2018-10-25 | End: 2018-10-26

## 2018-10-25 RX ORDER — LIDOCAINE HYDROCHLORIDE AND EPINEPHRINE 15; 5 MG/ML; UG/ML
INJECTION, SOLUTION EPIDURAL PRN
Status: DISCONTINUED | OUTPATIENT
Start: 2018-10-25 | End: 2018-10-26 | Stop reason: HOSPADM

## 2018-10-25 RX ORDER — OXYTOCIN/0.9 % SODIUM CHLORIDE 30/500 ML
1-24 PLASTIC BAG, INJECTION (ML) INTRAVENOUS CONTINUOUS
Status: DISCONTINUED | OUTPATIENT
Start: 2018-10-25 | End: 2018-10-26

## 2018-10-25 RX ADMIN — LIDOCAINE HYDROCHLORIDE,EPINEPHRINE BITARTRATE 3 ML: 15; .005 INJECTION, SOLUTION EPIDURAL; INFILTRATION; INTRACAUDAL; PERINEURAL at 22:19

## 2018-10-25 RX ADMIN — HYDROXYZINE HYDROCHLORIDE 100 MG: 50 TABLET, FILM COATED ORAL at 03:03

## 2018-10-25 RX ADMIN — RANITIDINE 150 MG: 150 TABLET ORAL at 11:33

## 2018-10-25 RX ADMIN — LIDOCAINE HYDROCHLORIDE,EPINEPHRINE BITARTRATE 3 ML: 15; .005 INJECTION, SOLUTION EPIDURAL; INFILTRATION; INTRACAUDAL; PERINEURAL at 12:45

## 2018-10-25 RX ADMIN — NALBUPHINE HYDROCHLORIDE 2.5 MG: 10 INJECTION, SOLUTION INTRAMUSCULAR; INTRAVENOUS; SUBCUTANEOUS at 17:24

## 2018-10-25 RX ADMIN — SODIUM CHLORIDE, POTASSIUM CHLORIDE, SODIUM LACTATE AND CALCIUM CHLORIDE: 600; 310; 30; 20 INJECTION, SOLUTION INTRAVENOUS at 20:18

## 2018-10-25 RX ADMIN — Medication 25 MCG: at 04:31

## 2018-10-25 RX ADMIN — SODIUM CHLORIDE, POTASSIUM CHLORIDE, SODIUM LACTATE AND CALCIUM CHLORIDE 1000 ML: 600; 310; 30; 20 INJECTION, SOLUTION INTRAVENOUS at 12:20

## 2018-10-25 RX ADMIN — Medication 4.5 ML: at 12:50

## 2018-10-25 RX ADMIN — NALBUPHINE HYDROCHLORIDE 2.5 MG: 10 INJECTION, SOLUTION INTRAMUSCULAR; INTRAVENOUS; SUBCUTANEOUS at 15:41

## 2018-10-25 RX ADMIN — BUPIVACAINE HYDROCHLORIDE 10 ML: 2.5 INJECTION, SOLUTION EPIDURAL; INFILTRATION; INTRACAUDAL at 21:09

## 2018-10-25 RX ADMIN — Medication 10 ML/HR: at 12:50

## 2018-10-25 RX ADMIN — SODIUM CHLORIDE, POTASSIUM CHLORIDE, SODIUM LACTATE AND CALCIUM CHLORIDE 1000 ML: 600; 310; 30; 20 INJECTION, SOLUTION INTRAVENOUS at 11:44

## 2018-10-25 RX ADMIN — Medication 25 MCG: at 06:38

## 2018-10-25 RX ADMIN — OXYTOCIN-SODIUM CHLORIDE 0.9% IV SOLN 30 UNIT/500ML 2 MILLI-UNITS/MIN: 30-0.9/5 SOLUTION at 16:46

## 2018-10-25 RX ADMIN — Medication 25 MCG: at 01:47

## 2018-10-25 ASSESSMENT — ENCOUNTER SYMPTOMS: SEIZURES: 0

## 2018-10-25 NOTE — PLAN OF CARE
Problem: Labor (Cervical Ripen, Induct, Augment) (Adult,Obstetrics,Pediatric)  Goal: Signs and Symptoms of Listed Potential Problems Will be Absent, Minimized or Managed (Labor)  Signs and symptoms of listed potential problems will be absent, minimized or managed by discharge/transition of care (reference Labor (Cervical Ripen, Induct, Augment) (Adult,Obstetrics,Pediatric) CPG).  Outcome: No Change  Induction of Labor Admit Note  Lani Lewis  MRN: [id number]  Gestational Age: 40w4d      Lani Lewis presents for induction of labor for poly.  Patient denies bleeding or ROM. Feeling contractions Q5-6 minutes. EFM applied.    Past Medical History:   Diagnosis Date     NO ACTIVE PROBLEMS      Dr. Donato notified of patient's arrival and condition.   Oriented patient to surroundings. Call light within reach.     Plan:   -cervical ripening vs pitocin    Pt speaks [place]. Declining in person  and would like to use ipad or phone at this time.

## 2018-10-25 NOTE — PROGRESS NOTES
[facility]  Labor Progress Note    S:  Patient comfortable. Feeling some cramping but able to rest overnight.     O:   Patient Vitals for the past 4 hrs:   BP Temp Temp src Resp   10/25/18 0624 111/61 98  F (36.7  C) Oral 16     SVE: C/L/H per RN at 0300     FHT: Baseline 130, mod variability, + accelerations, no decelerations  [place]: 2-3 contractions in 10 minutes    A/P:  Ms. Lani Lewis is a 33 year old  at 40w5d by 8w5d US, here for IOL for polyhydramnios and nonreassuring fetal heart tracing in clinic.    Labor: - S/p miso x3. Plan to place [place] when able.   FWB: - Category I FHT. Cephalic by BSUS. Plan to re-scan throughout labor given higher risk of unstable lie with polyhydramnios.   PNC: - Rh pos, Rubella immune, GBS neg,  GTT 83/111/96/88, Placenta posterior    Marcelina Donato MD  Ob/Gyn, PGY-1  10/25/2018, 8:04 AM

## 2018-10-25 NOTE — PLAN OF CARE
Problem: Patient Care Overview  Goal: Plan of Care/Patient Progress Review  Outcome: Therapy, progress toward functional goals as expected  VSS.  AFEBRILE.  ASSESSMENT WNL'S.  FHT'S 130, WITH MODERATE VARIABILITY, + ACCELS, ET OCCASIONAL LATE DECELS.  MEMBRANES INTACT ET NO VAGINAL BLEEDING.  SVE CLOSED/50/-3.  PO MISO, GIVEN PER INDUCTION PROTOCOL.  PT. ABLE TO REST, AFTER VISTARIL.  CONTINUE WITH PLAN OF CARE.

## 2018-10-25 NOTE — H&P
[facility]  OB History and Physical      Lani Lewis MRN# [id number]   Age: 33 year old YOB: 1985     CC:  Induction of labor     HPI:  Ms. Lani Lewis is a 33 year old  at 40w4d by 8w5d US, who presents for scheduled induction of labor.  She has irregular contractions but nothing very painful. Denies vaginal bleeding and loss of fluid.  + normal fetal movement. No HA, visual changes, cp, SOB, dysuria.     Pregnancy Complications:  - Polyhydramnios - SUSAN 24.2  - Grandmultiparity  - History of GDM, passed GTT this pregnancy      Prenatal Labs:   Lab Results   Component Value Date    ABO O 2018    RH Pos 2018    AS NEGATIVE 2018    HEPBANG NEGATIVE 2018    HGB 12.4 10/03/2018       GBS Status:   Lab Results   Component Value Date    GBS Negative 10/03/2018       Ultrasounds  Dating - 8w5d US  Anatomy - posterior placenta, normal anatomy   Growth - 75%ile  SUSAN 24.2     OB History  Obstetric History       T7      L7     SAB0   TAB0   Ectopic0   Multiple0   Live Births7       # Outcome Date GA Lbr Kavon/2nd Weight Sex Delivery Anes PTL Lv   8 Current            7 Term 14 40w0d  4.082 kg (9 lb) F    NIELS   6 Term 12 40w0d  4.082 kg (9 lb) F    NIELS   5 Term 11/10/10 42w2d  4.125 kg (9 lb 1.5 oz) M   N NIELS   4 Term 08 40w0d  3.175 kg (7 lb) F   N NIELS   3 Term 07 40w0d  4.536 kg (10 lb) M   Y NIELS   2 Term 05 40w0d  3.629 kg (8 lb) M   N NIELS   1 Term 03 40w0d  3.629 kg (8 lb) F   N NIELS          PMHx:   Past Medical History:   Diagnosis Date     NO ACTIVE PROBLEMS      PSHx:   Past Surgical History:   Procedure Laterality Date     NO HISTORY OF SURGERY       Meds:   Prescriptions Prior to Admission   Medication Sig Dispense Refill Last Dose     docusate sodium (COLACE) 100 MG capsule Take 100 mg by mouth   Past Month at Unknown time     ferrous sulfate (IRON) 325 (65 Fe) MG  tablet Take 325 mg by mouth   Past Month at Unknown time     Prenatal Vit-Fe Fumarate-FA (TRINATE) TABS Take 1 tablet by mouth daily 200 tablet 3 Past Month at Unknown time     ranitidine (ZANTAC) 150 MG tablet Take 1 tablet (150 mg) by mouth 2 times daily 120 tablet 3 10/24/2018 at Unknown time     order for DME Equipment being ordered: tall CAm 9 1/2 1 Device 0 More than a month at Unknown time     Allergies:  No Known Allergies   FmHx:   Family History   Problem Relation Age of Onset     Diabetes Father      SocHx: She denies any tobacco, alcohol, or other drug use during this pregnancy.    ROS:   Complete 10-point ROS negative except as noted in HPI.     PE:  Vit: Patient Vitals for the past 4 hrs:   BP Temp Temp src Resp   10/24/18 2200 99/61 97.7  F (36.5  C) Oral 18      Gen: Well-appearing, NAD, comfortable   CV: rrr, no mrg   Pulm: Ctab, no wheezes or crackles   Abd: Soft, gravid, non-tender  Ext:      LE edema b/l  Cx: C/L/H (checked in clinic today, not repeated)     Pres:  ceph by BSUS  EFW:  9# by Leopold's. Pelvis proven to 10#.   Memb: intact              FHT: Baseline 130, mod variability, + accelerations, no decelerations   Drain: 1-2 contractions in 10 minutes      Assessment  Ms. Lani Lewis is a 33 year old , at 40w4d by 8w5d US, who presents for scheduled IOL for polyhydramnios and borderline NST in clinic today.    Plan  Admit to L&D  Labor: Anticipate . Cervical ripening with misoprostol. Likely transition to pitocin after a few doses, and plan needle amniotomy vs AROM depending on station.   FWB: Category I FHT now.  Continue EFM and toco  Pain: Desires epidural for analgesia  PNC: Rh pos, Rubella immune, GBS neg,  GTT 83/111/96/88, Placenta posterior  Fen/GI: Clear liquid diet, IVF    The patient was discussed with Dr. Pickering who is in agreement with the treatment plan.      Marcelina Donato MD PGY2  Department of OB/GYN  10/24/2018 10:27 PM      Physician Attestation   I,  Codi Pickering, personally examined and evaluated this patient.  I discussed the patient with the medical student and/or resident and care team, and agree with the assessment and plan of care as documented in the note of 10/24/18 [date].      I personally reviewed vital signs, medications, labs, imaging and exam and fetal monitoring.    Key findings: 33 year old  at 40w5d with polyhydramnios, decreased fetal movement and category 2 tracing in clinic due to 1 decel and periods of minimal variability. She is here for induction of labor. Cervix unfavorable. Category 1 tracing on admission. Fetus cephalic, EFW 9lb. Will ripen cervix with misoprostol and anticipate .   Codi Pickering MD  Date of Service (when I saw the patient): 10/25/18

## 2018-10-25 NOTE — PROGRESS NOTES
[facility] PROGRESS NOTE:   2018   11:35 AM         SUBJECTIVE:   Patient c/o more cotnractions.    Contractions:  q 5-7 minutes  Leakage of fluid:  No  Vaginal bleeding:  No  Pain controlled:  Yes           OBJECTIVE:     Vitals:    10/24/18 2346 10/25/18 0301 10/25/18 0624 10/25/18 0755   BP: 119/76 108/67 111/61 120/64   Resp: 16 20 16 16   Temp: 97.5  F (36.4  C) 98.2  F (36.8  C) 98  F (36.7  C) 98.1  F (36.7  C)   TempSrc: Oral Oral Oral Oral         NST:  Fetal Heart Rate Tracing:   Baseline: 135  Variability: Moderate  Accels, no decels    Tocometer: q 5 minutes    Abdomen:  Gravid, NT  Cervix:   Dilation: 2   Effacement: 70%   Station:-2   Consistency: soft   Position: Anterior           LABS:   No results found for this or any previous visit (from the past 12 hour(s)).           ASSESSMENT:   33 year old  at 40w5d   induction of labor, indication polyhydramnios, cat 2 tracing on admission           PLAN:   Discussed favorable cervix with patient.   Plan AROM.   Prefers epidural first.   Discussed risk factors for PPH. Will place second IV. Confirm cephalic presentation prior to AROM.     Staci Curtis MD

## 2018-10-25 NOTE — ANESTHESIA PROCEDURE NOTES
Epidural Procedure Note    Staff:     Anesthesiologist:  JAYNE PERES    Resident/CRNA:  AILYN ALEXANDER    Procedure performed by resident/CRNA in the presence of a teaching physician    Location: OB     Procedure start time:  10/25/2018 12:30 PM     Procedure end time:  10/25/2018 12:50 PM   Pre-procedure checklist:   patient identified, IV checked, site marked, risks and benefits discussed, informed consent, monitors and equipment checked, pre-op evaluation, at physician/surgeon's request and post-op pain management      Correct Patient: Yes      Correct Position: Yes      Correct Site: Yes      Correct Procedure: Yes      Correct Laterality:  Yes    Site Marked:  Yes  Procedure:     Procedure:  Epidural catheter    ASA:  3    Position:  Sitting    Sterile Prep: chloraprep      Insertion site:  L3-4    Local skin infiltration:  2% lidocaine    amount (mL):  3    Approach:  Midline    Needle gauge (G):  17    Needle Length (in):  3.5    Block Needle Type:  Touhy    Injection Technique:  LORT saline    BRIGITTE at (cm):  7    Attempts:  1    Redirects:  0    Catheter gauge (G):  19    Catheter threaded easily: Yes      Threaded to cm at skin:  11    Paresthesias:  No    Aspiration negative for Heme or CSF: Yes       Local anesthetic:  Lidocaine 1.5% w/ 1:200,000 epinephrine    Test dose time:  12:45    Test dose negative for signs of intravascular, subdural or intrathecal injection: Yes

## 2018-10-25 NOTE — PROVIDER NOTIFICATION
REVIEWED EFM STRIP, INFORMED 2ND MISO GIVEN @ 8380.  DISCUSSING PLAN OF CARE, PT. VERBALIZES UNDERSTANDING.  NO ADDITIONAL ORDERS RECEIVED.

## 2018-10-25 NOTE — PLAN OF CARE
"Problem: Patient Care Overview  Goal: Plan of Care/Patient Progress Review  Outcome: No Change  VSS. Pt denies pain at this time and declining medication for sleep. Feeling contractions and describing them as \"tightness.\" EFM AGA - see flowsheet. Per Dr. Donato plan to start IOL with PO miso - awaiting orders. Pt planning an epidural in active labor. Sister at bedside and supportive. Continue plan of care.       "

## 2018-10-25 NOTE — ANESTHESIA PREPROCEDURE EVALUATION
")Anesthesia Pre-Procedure Evaluation    Patient: Lani Lewis   MRN:     [id number] Gender:   female   Age:    33 year old :      1985        Preoperative Diagnosis: * No pre-op diagnosis entered *   * No procedures listed *       Past Medical History:   Diagnosis Date     NO ACTIVE PROBLEMS      Past Surgical History:   Procedure Laterality Date     NO HISTORY OF SURGERY         Anesthesia Evaluation       history and physical reviewed .             ROS/MED HX    ENT/Pulmonary:       Neurologic:      (-) seizures   Cardiovascular:        (-) PIH   METS/Exercise Tolerance:     Hematologic:         Musculoskeletal:         GI/Hepatic:        (-) GERD and hepatitis   Renal/Genitourinary:         Endo:         Psychiatric:         Infectious Disease:         Malignancy:         Other:                     [facility] AN PHYSICAL EXAM    Lab Results   Component Value Date    WBC 7.8 10/24/2018    HGB 10.9 (L) 10/24/2018    HCT 33.9 (L) 10/24/2018     10/24/2018       Preop Vitals  BP Readings from Last 3 Encounters:   10/25/18 120/64   10/24/18 94/56   10/17/18 98/62    Pulse Readings from Last 3 Encounters:   18 80   18 96   18 76      Resp Readings from Last 3 Encounters:   10/25/18 16   08/05/15 16    SpO2 Readings from Last 3 Encounters:   10/24/18 100%   18 100%   08/05/15 100%      Temp Readings from Last 1 Encounters:   10/25/18 36.7  C (98.1  F) (Oral)    Ht Readings from Last 1 Encounters:   10/24/18 1.715 m (5' 7.5\")      Wt Readings from Last 1 Encounters:   10/24/18 129.3 kg (285 lb 1.6 oz)    Estimated body mass index is 43.99 kg/(m^2) as calculated from the following:    Height as of an earlier encounter on 10/24/18: 1.715 m (5' 7.5\").    Weight as of an earlier encounter on 10/24/18: 129.3 kg (285 lb 1.6 oz).     LDA:  Peripheral IV 10/24/18 Right Hand (Active)   Site Assessment WDL 10/25/2018  6:32 AM   Line Status Saline locked 10/25/2018  7:55 AM   Phlebitis Scale " 0-->no symptoms 10/25/2018  6:32 AM   Infiltration Scale 0 10/25/2018  6:32 AM   Number of days:1       Peripheral IV 10/25/18 Left Lower forearm (Active)   Number of days:0       [facility] AN PLAN NO PONV RULE      (-) no pre-eclampsia and gestational diabetes                 Tasha Patel MD

## 2018-10-25 NOTE — PROGRESS NOTES
[facility] PROGRESS NOTE:   2018   1:06 PM         SUBJECTIVE:   Patient c/o comfortable with epidural.    Contractions:  q 5 minutes  Leakage of fluid:  No  Vaginal bleeding:  No  Pain controlled:  Yes           OBJECTIVE:     Vitals:    10/24/18 2346 10/25/18 0301 10/25/18 0624 10/25/18 0755   BP: 119/76 108/67 111/61 120/64   Resp: 16 20 16 16   Temp: 97.5  F (36.4  C) 98.2  F (36.8  C) 98  F (36.7  C) 98.1  F (36.7  C)   TempSrc: Oral Oral Oral Oral         NST:  Fetal Heart Rate Tracing:   Baseline: 140  Variability: Moderate  Accels, no decels    Tocometer: q 5 minutes    Abdomen:  Gravid, NT  Cervix:   Dilation: 2   Effacement: 70%   Station:-2   Consistency: soft   Position: Anterior  AROM to clear fluid         LABS:   No results found for this or any previous visit (from the past 12 hour(s)).           ASSESSMENT:   33 year old  at 40w5d   induction of labor, indication non-reassuring fetal non-stress test.           PLAN:   Epidural in place  Confirmed cephalic with ultrasound.   Second IV in place  Anticipate     Staci Curtis MD

## 2018-10-25 NOTE — PROGRESS NOTES
[facility]  Labor Progress Note    S:  Patient comfortable. Feeling some cramping would like to wait longer before getting a cervical check.    O:   Patient Vitals for the past 4 hrs:   BP Temp Temp src Resp   10/25/18 0755 120/64 98.1  F (36.7  C) Oral 16   10/25/18 0624 111/61 98  F (36.7  C) Oral 16     SVE: C/L/H per RN at 0300     FHT: Baseline 130, mod variability, no accelerations, no decelerations  [place]: 3-4 contractions in 10 minutes    A/P:  Ms. Lani Lewis is a 33 year old  at 40w5d by 8w5d US, here for IOL for polyhydramnios and nonreassuring fetal heart tracing in clinic.    Labor: - S/p miso x4 (0638). Plan to place Cook when able.    - Concern that patient may have received a dose of 200mcg miso at 0431. Discussed with RN and charge and will figure out how to proceed.  FWB: - Category I FHT thought not reactive at this time.  Will continue to monitor closely. Cephalic by BSUS. Plan to re-scan throughout labor given higher risk of unstable lie with polyhydramnios  PNC: - Rh pos, Rubella immune, GBS neg,  GTT 83/111/96/88, Placenta posterior    Amy Schumer, MD  Ob/Gyn, PGY-2  10/25/2018, 10:20 AM

## 2018-10-25 NOTE — PLAN OF CARE
Problem: Labor (Cervical Ripen, Induct, Augment) (Adult,Obstetrics,Pediatric)  Goal: Signs and Symptoms of Listed Potential Problems Will be Absent, Minimized or Managed (Labor)  Signs and symptoms of listed potential problems will be absent, minimized or managed by discharge/transition of care (reference Labor (Cervical Ripen, Induct, Augment) (Adult,Obstetrics,Pediatric) CPG).   Outcome: Therapy, progress toward functional goals as expected  Pt cervix changed from closed to 1.5cm.  at bedside and offered epidural before AROM. Pt would appreciate this. Epidural placed at 1300. Pt AROM per  at 1305 for large amounts of clear fluid. Difficult to monitor contractions when pt is on her sides. Pt comfortable with epidural. Settled for some sleep. Continue to monitor closely.

## 2018-10-26 PROCEDURE — 72200001 ZZH LABOR CARE VAGINAL DELIVERY SINGLE

## 2018-10-26 PROCEDURE — 25000128 H RX IP 250 OP 636: Performed by: ANESTHESIOLOGY

## 2018-10-26 PROCEDURE — 25000132 ZZH RX MED GY IP 250 OP 250 PS 637: Performed by: STUDENT IN AN ORGANIZED HEALTH CARE EDUCATION/TRAINING PROGRAM

## 2018-10-26 PROCEDURE — 12000030 ZZH R&B OB INTERMEDIATE UMMC

## 2018-10-26 PROCEDURE — 59410 OBSTETRICAL CARE: CPT | Mod: GC | Performed by: OBSTETRICS & GYNECOLOGY

## 2018-10-26 RX ORDER — LANOLIN 100 %
OINTMENT (GRAM) TOPICAL
Status: DISCONTINUED | OUTPATIENT
Start: 2018-10-26 | End: 2018-10-27 | Stop reason: HOSPADM

## 2018-10-26 RX ORDER — IBUPROFEN 800 MG/1
800 TABLET, FILM COATED ORAL EVERY 6 HOURS PRN
Status: DISCONTINUED | OUTPATIENT
Start: 2018-10-26 | End: 2018-10-27 | Stop reason: HOSPADM

## 2018-10-26 RX ORDER — ACETAMINOPHEN 325 MG/1
650 TABLET ORAL EVERY 4 HOURS PRN
Status: DISCONTINUED | OUTPATIENT
Start: 2018-10-26 | End: 2018-10-27 | Stop reason: HOSPADM

## 2018-10-26 RX ORDER — METHYLERGONOVINE MALEATE 0.2 MG/ML
200 INJECTION INTRAVENOUS
Status: DISCONTINUED | OUTPATIENT
Start: 2018-10-26 | End: 2018-10-27 | Stop reason: HOSPADM

## 2018-10-26 RX ORDER — MISOPROSTOL 200 UG/1
800 TABLET ORAL
Status: DISCONTINUED | OUTPATIENT
Start: 2018-10-26 | End: 2018-10-27 | Stop reason: HOSPADM

## 2018-10-26 RX ORDER — CARBOPROST TROMETHAMINE 250 UG/ML
250 INJECTION, SOLUTION INTRAMUSCULAR
Status: DISCONTINUED | OUTPATIENT
Start: 2018-10-26 | End: 2018-10-27 | Stop reason: HOSPADM

## 2018-10-26 RX ORDER — CALCIUM CARBONATE 500 MG/1
500 TABLET, CHEWABLE ORAL DAILY PRN
Status: DISCONTINUED | OUTPATIENT
Start: 2018-10-26 | End: 2018-10-27 | Stop reason: HOSPADM

## 2018-10-26 RX ORDER — AMOXICILLIN 250 MG
1 CAPSULE ORAL 2 TIMES DAILY
Status: DISCONTINUED | OUTPATIENT
Start: 2018-10-26 | End: 2018-10-27 | Stop reason: HOSPADM

## 2018-10-26 RX ORDER — NALOXONE HYDROCHLORIDE 0.4 MG/ML
.1-.4 INJECTION, SOLUTION INTRAMUSCULAR; INTRAVENOUS; SUBCUTANEOUS
Status: DISCONTINUED | OUTPATIENT
Start: 2018-10-26 | End: 2018-10-27 | Stop reason: HOSPADM

## 2018-10-26 RX ORDER — OXYTOCIN/0.9 % SODIUM CHLORIDE 30/500 ML
100 PLASTIC BAG, INJECTION (ML) INTRAVENOUS CONTINUOUS
Status: DISCONTINUED | OUTPATIENT
Start: 2018-10-26 | End: 2018-10-27 | Stop reason: HOSPADM

## 2018-10-26 RX ORDER — OXYTOCIN 10 [USP'U]/ML
10 INJECTION, SOLUTION INTRAMUSCULAR; INTRAVENOUS
Status: DISCONTINUED | OUTPATIENT
Start: 2018-10-26 | End: 2018-10-27 | Stop reason: HOSPADM

## 2018-10-26 RX ORDER — BISACODYL 10 MG
10 SUPPOSITORY, RECTAL RECTAL DAILY PRN
Status: DISCONTINUED | OUTPATIENT
Start: 2018-10-28 | End: 2018-10-27 | Stop reason: HOSPADM

## 2018-10-26 RX ORDER — OXYTOCIN/0.9 % SODIUM CHLORIDE 30/500 ML
340 PLASTIC BAG, INJECTION (ML) INTRAVENOUS CONTINUOUS PRN
Status: DISCONTINUED | OUTPATIENT
Start: 2018-10-26 | End: 2018-10-27 | Stop reason: HOSPADM

## 2018-10-26 RX ORDER — HYDROCORTISONE 2.5 %
CREAM (GRAM) TOPICAL 3 TIMES DAILY PRN
Status: DISCONTINUED | OUTPATIENT
Start: 2018-10-26 | End: 2018-10-27 | Stop reason: HOSPADM

## 2018-10-26 RX ORDER — AMOXICILLIN 250 MG
2 CAPSULE ORAL 2 TIMES DAILY
Status: DISCONTINUED | OUTPATIENT
Start: 2018-10-26 | End: 2018-10-27 | Stop reason: HOSPADM

## 2018-10-26 RX ORDER — OXYCODONE HYDROCHLORIDE 5 MG/1
5 TABLET ORAL
Status: DISCONTINUED | OUTPATIENT
Start: 2018-10-26 | End: 2018-10-27 | Stop reason: HOSPADM

## 2018-10-26 RX ADMIN — ACETAMINOPHEN 650 MG: 325 TABLET, FILM COATED ORAL at 19:18

## 2018-10-26 RX ADMIN — ACETAMINOPHEN 650 MG: 325 TABLET, FILM COATED ORAL at 15:51

## 2018-10-26 RX ADMIN — IBUPROFEN 800 MG: 800 TABLET ORAL at 22:51

## 2018-10-26 RX ADMIN — ACETAMINOPHEN 650 MG: 325 TABLET, FILM COATED ORAL at 06:00

## 2018-10-26 RX ADMIN — SENNOSIDES AND DOCUSATE SODIUM 1 TABLET: 8.6; 5 TABLET ORAL at 10:02

## 2018-10-26 RX ADMIN — SENNOSIDES AND DOCUSATE SODIUM 1 TABLET: 8.6; 5 TABLET ORAL at 19:18

## 2018-10-26 RX ADMIN — NALBUPHINE HYDROCHLORIDE 2.5 MG: 10 INJECTION, SOLUTION INTRAMUSCULAR; INTRAVENOUS; SUBCUTANEOUS at 01:04

## 2018-10-26 RX ADMIN — IBUPROFEN 800 MG: 800 TABLET ORAL at 15:51

## 2018-10-26 RX ADMIN — IBUPROFEN 800 MG: 800 TABLET ORAL at 10:02

## 2018-10-26 RX ADMIN — IBUPROFEN 800 MG: 800 TABLET ORAL at 01:22

## 2018-10-26 NOTE — PLAN OF CARE
Data: Lani Lewis transferred to 7130 via wheelchair at 0250. Baby transferred via parent's arms.  Action: Receiving unit notified of transfer: Yes. Patient and family notified of room change. Report given to CHAS Guillermo RN at 0300. Belongings sent to receiving unit. Accompanied by Registered Nurse. Oriented patient to surroundings. Call light within reach. ID bands double-checked with receiving RN.  Response: Patient tolerated transfer and is stable.

## 2018-10-26 NOTE — PLAN OF CARE
Problem: Labor (Cervical Ripen, Induct, Augment) (Adult,Obstetrics,Pediatric)  Goal: Signs and Symptoms of Listed Potential Problems Will be Absent, Minimized or Managed (Labor)  Signs and symptoms of listed potential problems will be absent, minimized or managed by discharge/transition of care (reference Labor (Cervical Ripen, Induct, Augment) (Adult,Obstetrics,Pediatric) CPG).   Outcome: Improving  VSS. Afebrile. Pt complaining of pain not being controlled with epidural. MDA contacted. Epidural replaced. Pitocin started and adjusted as appropriate. Pt comfortable. Support person at bedside. Report given to LLOYD Carrasco. Continue with plan of care.

## 2018-10-26 NOTE — PROVIDER NOTIFICATION
10/25/18 2026   Provider Notification   Provider Name/Title MDA    Method of Notification Phone   Request Evaluate in Person   Notification Reason Pain   Pt complaining that she is not getting relief from pain with epidural. Pt encouraged to use PCEA. MDA contacted to evaluate patient in person. Will re-evaluate after pt is seen by MDA. Continue with plan of care.

## 2018-10-26 NOTE — PROVIDER NOTIFICATION
Pt c/o heartburn. Requesting to take zantac. Please order. Text paged G2 Dr. Schumer for request.

## 2018-10-26 NOTE — DISCHARGE SUMMARY
[facility] Discharge Summary    Lani Lewis MRN# [id number]   Age: 33 year old YOB: 1985     Date of Admission:  10/24/2018  Date of Discharge:  10/27/2018  Admitting Physician:  Staci Curtis MD  Discharge Physician:  Daly Jacobs MD    Admit Dx:   - Intrauterine pregnancy at 40w6d   - Polyhydramnios  - Nonreassuring fetal tracing  - Anemia  - Grandmultiparity    Discharge Dx:  - Same as above, s/p     Procedures:  - Spontaneous vaginal delivery  - Epidural analgesia    Admit HPI/Labor Course:  Lani Lewis is a 33 year old  female who was admitted for induction of labor for polyhydramnios and nonreassuring fetal tracing. Pregnancy was complicated by grandmultiparity and polyhydramnios. GBS negative; did not require pencillin.  She had an epidural for pain control. Labor was induced with misoprostol and augmented with pitocin. She underwent AROM at clear with 1430 fluid. She progressed to complete and with good maternal effort delivered a liveborn female infant from OA position at 0038. APGARs 9 and 9 at 1 and 5 minutes. Weight 3550g. Cord allowed to pulse for up to 1 minute, then clamped and cut. Placenta delivered spontaneously with gentle cord traction and suprapubic countertraction; intact 3V cord, placenta intact. IV pitocin was given. Perineum examined and no lacerations noted. EBL 100cc. Fundus firm and perineum hemostatic.  Dr. Curtis present for delivery.     Please see her Admission H&P and Delivery Summary for further details.    Postpartum Course:  Her postpartum course was uncomplicated. On PPD#1, she was meeting all of her postpartum goals and deemed stable for discharge. She was voiding without difficulty, tolerating a regular diet without nausea and vomiting, her pain was well controlled on oral pain medicines and her lochia was appropriate. Her hemoglobin prior to delivery was 10.9 and after delivery was 11.3. Her Rh status was  positive, and Rhogam was not indicated.     Discharge Medications:     Review of your medicines      START taking       Dose / Directions    ibuprofen 600 MG tablet   Commonly known as:  ADVIL/MOTRIN        Dose:  600 mg   Take 1 tablet (600 mg) by mouth every 6 hours as needed for other (cramping)   Quantity:  30 tablet   Refills:  0       senna-docusate 8.6-50 MG per tablet   Commonly known as:  SENOKOT-S;PERICOLACE   Used for:  Constipation, unspecified constipation type        Dose:  2 tablet   Take 2 tablets by mouth 2 times daily as needed for constipation   Quantity:  60 tablet   Refills:  0         CONTINUE these medicines which have NOT CHANGED       Dose / Directions    docusate sodium 100 MG capsule   Commonly known as:  COLACE        Dose:  100 mg   Take 100 mg by mouth   Refills:  0       order for DME   Used for:  Closed fracture of distal end of left fibula, unspecified fracture morphology, initial encounter        Equipment being ordered: tall CAm 9 1/2   Quantity:  1 Device   Refills:  0       TRINATE Tabs   Used for:  History of macrosomia in infant in prior pregnancy, currently pregnant        Dose:  1 tablet   Take 1 tablet by mouth daily   Quantity:  200 tablet   Refills:  3         STOP taking          ferrous sulfate 325 (65 Fe) MG tablet   Commonly known as:  IRON           ranitidine 150 MG tablet   Commonly known as:  ZANTAC                Where to get your medicines      These medications were sent to [facility] [address]  [address] [address]     Phone:  [phone]      ibuprofen 600 MG tablet     senna-docusate 8.6-50 MG per tablet           Discharge/Disposition:  Lani Lewis was discharged to home in stable condition with the following instructions/medications:  1) Call for temperature > 100.4, bright red vaginal bleeding >1 pad an hour x 2 hours, foul smelling vaginal discharge, pain not controlled by usual oral  pain meds, persistent nausea and vomiting not controlled on medications  2) She desired Nexplanon for contraception.  3) For feeding she decided to breastfeed with formula supplementation.  4) She was instructed to follow-up with her primary OB in 6 weeks for a routine postpartum visit.    Chun Humphrey MD  OB/GYN Resident, PGY-1  10/27/2018 8:43 AM     I, Daly Jacobs MD, personally saw and evaluated this patient.  I discussed the patient with the resident and care team, and agree with the assessment and plan of care as documented in the resident's note of 10/27/18.  I personally reviewed vital signs, medications, lab, and exam.     Key Findings:  Meeting goals for discharge.  Abdomen non-tender, fundus firm.       PLAN:  Discharge home.     Daly Jacobs MD   Date of Service (when I saw the patient) 10/27/18   '

## 2018-10-26 NOTE — PROGRESS NOTES
Anesthesiology Note:    Called to patient's bedside to evaluate increasing pain. Previously epidural had been very effective per patient but over the last hour pain has been increasing. Mostly a lower abdominal area pressure with contractions. No lateralization.     VSS, afebrile  Epidural catheter in place at what looks to be 11cm.    I bolused her catheter with 10ml of 0.25% bupivacaine noting some resistance but able to flush through and the patient noting some cool sensation on her back.     Plan:  - reassess level around 2130  - if ineffective, and she continues to have contraction associated pain, we should replace the catheter.     Addison Chopra MD  CA 3  [phone]

## 2018-10-26 NOTE — PROGRESS NOTES
Anesthesiology Note:    Patient continued to have pain and on sensory testing after bolus, did not endorse a sensory level. We discussed options for management and the patient was amenable to replacement.     Epidural catheter replaced at bedside (see separate note) and tolerated well.     Addison Chopra MD  CA3  [phone]

## 2018-10-26 NOTE — PLAN OF CARE
Problem: Patient Care Overview  Goal: Plan of Care/Patient Progress Review  Outcome: Improving  Patient vital signs and assessment findings within normal. Fundal assessment firm and midline, bleeding scant. BP trending down, though stable. Patient pitocin stopped at 0300, saline locked. Patient is up ad yi and able to care for self and baby. Patient is able to void but has not had a bowel movement. Patient is breastfeeding independently and able to obtain a deep latch. Patient is supported by  at bedside.

## 2018-10-26 NOTE — PROVIDER NOTIFICATION
10/25/18 2115   Provider Notification   Provider Name/Title MDA   Method of Notification At Bedside   Request Evaluate in Person   Notification Reason Pain   Epidural bolus given. Continue with plan of care.   parents, pediatric residents, nursing

## 2018-10-26 NOTE — L&D DELIVERY NOTE
Delivery Summary - No Miller  Delivery Summary    Delivery Summary:   Lani Lewis is a 33 year old  female who was admitted for induction of labor for polyhydramnios and nonreassuring fetal tracing. Pregnancy was complicated by grandmultiparity and polyhydramnios. GBS negative; did not require pencillin.  She had an epidural for pain control. Labor was induced with misoprostol and augmented with pitocin. She underwent AROM at clear with 1430 fluid. She progressed to complete and with good maternal effort delivered a liveborn female infant from OA position at 0038. APGARs 9 and 9 at 1 and 5 minutes. Weight 3550g. Cord allowed to pulse for up to 1 minute, then clamped and cut. Placenta delivered spontaneously with gentle cord traction and suprapubic countertraction; intact 3V cord, placenta intact. IV pitocin was given. Perineum examined and no lacerations noted. EBL 100cc. Fundus firm and perineum hemostatic.  Dr. Curtis present for delivery.     Marcelina Donato MD   Resident Physician, PGY2  Obstetrics, Gynecology, and Women's Health    Physician Attestation  I was present for the entire delivery, including baby and placenta.    Staci Curtis  Date of Service (when I saw the patient): 10/26/18      Lani Lewis MRN# [id number]   Age: 33 year old YOB: 1985     Labor Event Times:    Labor Onset Date       Labor Onset Time    Dilation Complete Date    Dilation Complete Time       Start Pushing Date        Start Pushing Time            Labor Length:    1st Stage (hrs/min)     2nd Stage (hrs/min)     3rd Stage (hrs/min)         Labor Events:     Labor No   Rupture Date     Rupture Time     Rupture Type Artificial Rupture of Membranes   Fluid Color     Labor Type     Induction    Induction Indication         Augmentation    Labor Complications     Additional Complications     Management of Labor        Antibiotics     IV Antibiotic Given     Additional Management     Fetal Status  "Prior to  Delivery     Fetal Status Comments         Cervical Ripening:    Date     Time     Type         Delivery:    Episiotomy None   Local Anesthetic        Lacerations None   Sponge Count Correct       Needle Count Correct     Final Count by:    Sutures     Blood loss (ml) 100   Packing Intentionally Left In     Number     Comments           Information for the patient's :  Anel Lewis [[id number]]       Delivery  10/26/2018 12:38 AM by  Vaginal, Spontaneous Delivery  Sex:  female Gestational Age: 40w6d  Delivery Clinician:     Living?:            APGARS  One minute Five minutes Ten minutes   Skin color:            Heart rate:            Grimace:            Muscle tone:            Breathing:            Totals: 9  9         Presentation/position:           Resuscitation and Interventions: Method:  None  Oxygen Type:     Intubation Time:   # of Attempts:     ETT Size:        Tracheal Suction:     Tracheal returns:      [place] Care at Delivery:  Viable female  delivered to maternal abdomen. Dried and stimulated with warm blankets, mouth suctioned with bulb syringe. No further resuscitation measures needed.        Cord information:     Disposition of cord blood:      Blood gases sent?    Complications:     Placenta: Delivered:           appearance.  Comments:  .  Disposition: Hospital disposal   Measurements:  Weight: 7 lb 13.2 oz (3550 g)  Height: 19\"  Head circumference: 36.8 cm  Chest circumference:     Temperature:     Other providers:       Additional  information:  Forceps:    Verbal Informed Consent Obtained:       Alternative Labor Strategies Discussed:     Emergency Resources Available:       Type:       Accrued Pulling Time:       # of Pulls:      Position:     Fetal Station:       Indications:      Other Indications:     Operative Vaginal Delivery Brief Note Forceps:        Vacuum:    Verbal Informed Consent Obtained:     Alternative Labor Strategies Discussed:   "   Emergency Resources Available:     Type:      Accrued Pulling Time:       # of Pop-Offs:       # of Pulls:       Position:     Fetal Station:      Indications for Vacuum:       Other Indications:    Operative Vaginal Delivery Brief Note Vacuum:        Shoulder Dystocia Shoulder Dystocia    Fetal Tracing Prior to Delivery:  Category 2   Fetal Tracing Comments:  variables   Shoulder dystocia present?:  No                                            Breech:       : Type:     Indications for Primary:     Indications for Secondary:     Other Indications:        Observed anomalies     Output in Delivery Room:

## 2018-10-26 NOTE — PLAN OF CARE
Problem: Patient Care Overview  Goal: Plan of Care/Patient Progress Review  Outcome: No Change  Data: Lani Lewis transferred to 7130 via wheelchair at 0300. Baby transferred via parent's arms.  Action: Receiving unit notified of transfer: Yes. Patient and family notified of room change. Report given to Darline GRACE, RN at 0300 from CITLALLI Rouse RN. Belongings sent to receiving unit. Accompanied by Registered Nurse. Oriented patient to surroundings. Call light within reach.  Response: Patient tolerated transfer and is stable.

## 2018-10-26 NOTE — PLAN OF CARE
Problem: Labor (Cervical Ripen, Induct, Augment) (Adult,Obstetrics,Pediatric)  Goal: Signs and Symptoms of Listed Potential Problems Will be Absent, Minimized or Managed (Labor)  Signs and symptoms of listed potential problems will be absent, minimized or managed by discharge/transition of care (reference Labor (Cervical Ripen, Induct, Augment) (Adult,Obstetrics,Pediatric) CPG).   Outcome: Completed Date Met: 10/26/18  Vaginal delivery of viable infant.

## 2018-10-26 NOTE — PROVIDER NOTIFICATION
Pt c/o intense cramping pain. Using hot packs and not due for any other pain meds at this time. Pt requesting stronger pain meds. Oxycodone?  Text paged Dr. Schumer/G2. Awaiting call back.

## 2018-10-26 NOTE — ANESTHESIA PROCEDURE NOTES
Epidural Procedure Note    Staff:     Anesthesiologist:  VANESA CARTER    Resident/CRNA:  CODY ARCOS    Procedure performed by resident/CRNA in the presence of a teaching physician    Location: OB     Procedure start time:  10/25/2018 10:10 PM     Procedure end time:  10/25/2018 10:21 PM   Pre-procedure checklist:   patient identified, IV checked, site marked, risks and benefits discussed, informed consent, monitors and equipment checked, pre-op evaluation, at physician/surgeon's request and post-op pain management      Correct Patient: Yes      Correct Position: Yes      Correct Site: Yes      Correct Procedure: Yes      Correct Laterality:  Yes    Site Marked:  Yes  Procedure:     Procedure:  Epidural catheter    ASA:  2    Diagnosis:  Post op pain    Position:  Sitting    Sterile Prep: chloraprep      Insertion site:  L3-4    Local skin infiltration:  1% lidocaine    amount (mL):  3    Approach:  Midline    Needle gauge (G):  17    Needle Length (in):  3.5    Block Needle Type:  Touhy    Injection Technique:  LORT saline    BRIGITTE at (cm):  7.5    Attempts:  3    Redirects:  3    Catheter gauge (G):  20    Catheter threaded easily: Yes      Threaded to cm at skin:  12.5    Threaded in epidural space (cm):  5    Paresthesias:  No    Aspiration negative for Heme or CSF: Yes      Test dose (mL):  3     Local anesthetic:  Lidocaine 1.5% w/ 1:200,000 epinephrine    Test dose time:  20:19    Test dose negative for signs of intravascular, subdural or intrathecal injection: Yes

## 2018-10-26 NOTE — PLAN OF CARE
Problem: Patient Care Overview  Goal: Plan of Care/Patient Progress Review  Outcome: Improving  VSS. Afebrile. Up ad yi. Voiding and passing gas. No BM yet. Tolerating regular diet. Breast feeding well. Pt adamant about supplementing with formula. Discussed risks of formula vs benefits of breast feeding via scheduled . Also strongly encouraged pumping and hand expression. Mom politely and repeatedly declined hand expression and pumping and states she has extreme cramping with feeding baby at times. Also states has fed her other children both breast and bottle. Mom's feeding plan supported and encouraged to breast feed first and only supplement with formula if baby still rooting. Attentive to baby's needs. Will continue to monitor.

## 2018-10-26 NOTE — PROGRESS NOTES
[facility]  Labor Progress Note    S:  Patient now comfortable after epidural was replaced.    O:   Patient Vitals for the past 4 hrs:   BP Temp Temp src Resp SpO2   10/25/18 2242 - - - - 100 %   10/25/18 2236 143/55 - - - 100 %   10/25/18 2232 116/64 - - - 99 %   10/25/18 2230 116/70 98  F (36.7  C) - - 99 %   10/25/18 2228 111/71 - - - -   10/25/18 2226 116/71 - - - 99 %   10/25/18 2224 115/66 - - - 99 %   10/25/18 2222 109/68 - - - 99 %   10/25/18 2131 112/55 97.9  F (36.6  C) Oral - 99 %   10/25/18 2100 114/68 - - 18 100 %   10/25/18 2030 103/63 97.7  F (36.5  C) - 18 100 %   10/25/18 2000 108/53 - - 18 99 %     SVE: /-1    FHT: Baseline 140, mod variability, no accelerations, deep early decels, one possible late decel   [place]: 4 contractions in 10 minutes    A/P:  Ms. Lani Lewis is a 33 year old  at 40w5d by 8w5d US, here for IOL for polyhydramnios and nonreassuring FHT.    Labor: - S/p miso x4, AROM. Now on pitocin. IUPC placed as contractions were not tracing externally, now replaced after it fell out with epidural replacement. FSE not tracing; replaced but wiring still not working. Tracing well externally so will continue with external monitor. Now having early decels and in active labor. Anticipate progress to .   FWB: - Category II FHT, possible variable deceleration with deep early decelerations. Intrauterine resuscitation as needed.   PNC: - Rh pos, Rubella immune, GBS neg,  GTT 83/111/96/88, Placenta posterior    Marcelina Donato MD  Ob/Gyn, PGY-1  10/25/2018, 11:34 PM

## 2018-10-27 ENCOUNTER — DOCUMENTATION ONLY (OUTPATIENT)
Dept: CARE COORDINATION | Facility: CLINIC | Age: 33
End: 2018-10-27

## 2018-10-27 VITALS
RESPIRATION RATE: 18 BRPM | SYSTOLIC BLOOD PRESSURE: 103 MMHG | OXYGEN SATURATION: 85 % | TEMPERATURE: 97.7 F | DIASTOLIC BLOOD PRESSURE: 58 MMHG

## 2018-10-27 LAB — HGB BLD-MCNC: 11.3 G/DL (ref 11.7–15.7)

## 2018-10-27 PROCEDURE — 25000132 ZZH RX MED GY IP 250 OP 250 PS 637: Performed by: STUDENT IN AN ORGANIZED HEALTH CARE EDUCATION/TRAINING PROGRAM

## 2018-10-27 PROCEDURE — 90686 IIV4 VACC NO PRSV 0.5 ML IM: CPT | Performed by: OBSTETRICS & GYNECOLOGY

## 2018-10-27 PROCEDURE — 85018 HEMOGLOBIN: CPT | Performed by: STUDENT IN AN ORGANIZED HEALTH CARE EDUCATION/TRAINING PROGRAM

## 2018-10-27 PROCEDURE — 25000128 H RX IP 250 OP 636: Performed by: OBSTETRICS & GYNECOLOGY

## 2018-10-27 PROCEDURE — 36415 COLL VENOUS BLD VENIPUNCTURE: CPT | Performed by: STUDENT IN AN ORGANIZED HEALTH CARE EDUCATION/TRAINING PROGRAM

## 2018-10-27 RX ORDER — IBUPROFEN 600 MG/1
600 TABLET, FILM COATED ORAL EVERY 6 HOURS PRN
Qty: 30 TABLET | Refills: 0 | Status: SHIPPED | OUTPATIENT
Start: 2018-10-27 | End: 2019-03-06

## 2018-10-27 RX ORDER — AMOXICILLIN 250 MG
2 CAPSULE ORAL 2 TIMES DAILY PRN
Qty: 60 TABLET | Refills: 0 | Status: SHIPPED | OUTPATIENT
Start: 2018-10-27 | End: 2019-03-06

## 2018-10-27 RX ADMIN — IBUPROFEN 800 MG: 800 TABLET ORAL at 04:58

## 2018-10-27 RX ADMIN — SENNOSIDES AND DOCUSATE SODIUM 1 TABLET: 8.6; 5 TABLET ORAL at 07:58

## 2018-10-27 RX ADMIN — INFLUENZA A VIRUS A/MICHIGAN/45/2015 X-275 (H1N1) ANTIGEN (FORMALDEHYDE INACTIVATED), INFLUENZA A VIRUS A/SINGAPORE/INFIMH-16-0019/2016 IVR-186 (H3N2) ANTIGEN (FORMALDEHYDE INACTIVATED), INFLUENZA B VIRUS B/PHUKET/3073/2013 ANTIGEN (FORMALDEHYDE INACTIVATED), AND INFLUENZA B VIRUS B/MARYLAND/15/2016 BX-69A ANTIGEN (FORMALDEHYDE INACTIVATED) 0.5 ML: 15; 15; 15; 15 INJECTION, SUSPENSION INTRAMUSCULAR at 11:17

## 2018-10-27 RX ADMIN — ACETAMINOPHEN 650 MG: 325 TABLET, FILM COATED ORAL at 08:00

## 2018-10-27 RX ADMIN — IBUPROFEN 800 MG: 800 TABLET ORAL at 11:04

## 2018-10-27 NOTE — PLAN OF CARE
Problem: Patient Care Overview  Goal: Plan of Care/Patient Progress Review  Outcome: Improving  Pain meds given for uterine cramping, relief obtained. Fundus firm and midline, U1. Breastfeeding and formula feeding independently. Vss

## 2018-10-27 NOTE — PROGRESS NOTES
[facility] will be sharing updates with you on Maternal Child Health Referral requests for home care services.  This is for care coordination purposes and alert you to referral status.  We received the referral for  Lani Lewis; MRN [id number] and want to update you:    Home visit for postpartum/  assessment and education offered to patient.  Patient declined need for home care visit, per mom, this is her 8th child, she is doing great and does not need home care nurse visit.  Advised to follow up with Primary Care Providers for mom and baby.     Sincerely [facility]  Pj Hitchcock  [phone]

## 2018-10-27 NOTE — PROGRESS NOTES
Post Partum Progress Note    Subjective:  She is resting comfortably in bed this morning.  Complains of diffuse soreness in back, neck, abdomen. Pain is overall well controlled on current medication regimen. Required one dose of oxycodone last evening. Tolerating PO intake. Lochia present and minimal. Voiding without difficulty. Passing flatus, and had a BM. Ambulating without dizziness or difficulty.  She denies headache, changes in vision, nausea/vomiting, chest pain, shortness of breath, RUQ pain, or worsening edema. Breastfeeding with formula supplementation.    Objective:  Vitals:    10/26/18 0300 10/26/18 0628 10/26/18 0957 10/26/18 1543   BP: 105/51 97/59 114/71 94/66   Resp:  20   Temp: 98.3  F (36.8  C) 98.5  F (36.9  C) 97.7  F (36.5  C) 97.3  F (36.3  C)   TempSrc: Oral Oral Oral Oral   SpO2:           General: NAD. A&Ox3.  CV: RRR.  Pulm: CTAB. Normal respiratory effort.  Abd: Soft, non-tender, non-distended. Fundus is firm and below the umbilicus.   Ext: Trace edema. No calf tenderness.    Assessment/Plan:  Lani Lewis is a 33 year old  female who is PPD#1 s/p  after IOL for polyhydramnios and nonreassuring fetal tracing. Pregnancy was complicated by grandmultiparity and polyhydramnios. Doing well postpartum.    - Encourage ambulation  - PNC: Rh positive. Rubella immune. No intervention indicated.  - Pain: controlled on oral medications  - Heme: Hgb 10.9> > am pending 11.3.   - GI: continue anti-emetics and stool softeners as needed.  - : Voiding spontaneously .  - Infant: Stable in room with mom   - Feeding: Breastfeeding with formula supplementation  - BC: Plans on nexplanon at postpartum visit, declines interim birth control    Discharge to home on PPD#1, today    Chun Humphrey MD  OB/GYN Resident, PGY-1  10/26/2018 7:41 PM      I, Daly Jacobs MD, personally saw and evaluated this patient.  I discussed the patient with the resident and care team, and agree with the  assessment and plan of care as documented in the resident's note of 10/27/18.  I personally reviewed vital signs, medications, lab, and exam.     Key Findings:  Ambulating, tolerating diet.  Some back pain but overall doing well.     PLAN: Will discharge home today.      Daly Jacobs MD   Date of Service (when I saw the patient) 10/27/18

## 2018-10-27 NOTE — DISCHARGE INSTRUCTIONS
Vaginal Delivery Discharge Instructions: [facility]  [place]:     Waydiiso qoyska iyo saaxibadaa inay ku caawiyaan markaad u baahantahay.    Waxba dyer kor saarin ama dyer galin farjigaaga ilaa uu dhakhtarkaagu ansixiyo.    Si fudud u qaado dhowrka asbuuc e xiga si aad u ogalaato in jirkaagu ngoc kabto. Waxaad samayn kartaa howl kasta oo aad rabto ilaa meeshaas laga gaarayo.    Gaadhi dyer wadin markaad qaadanayso  kaniiniyada xanuunka ee dhkhatarku kuu qoray . waxaad gaadhi wadi kartaa haddii aad qaadanayso kaniiniyada xanuunka ee koontarka.    Wac bixiyahaaga daryeelka caafimaaad haddii aad qabtid mid ka mid ah calaamadahan ngoc socda:    Haddii suufka dhiigga nuuga uu ku buuxsamo 1 saac gudihiis, ama aad aragto xinjiro dhiig ah oo ka wayn kubadda golafka.     Dhiig soconaya wax kabadan 6 asbuuc.    Haddii aad qabto dheecaan farjiga ka imaanaya oo  si xun u uraya.     [facility]  F (38  C) am aka sii saraysa (heerkulka laga qaaday carabka hoostiiisa), oo qarqaryo leh ama aan lahayn     Xanuun, nabar, majiirid daran oo aad ka dareeto qaybta hoose ee ubucda.    Xanuun sii kordya, barar, guduudasho ama dheecaan ka dhiimaya meesha la tolay ee qaliinka.    Kaadi badan oo dagdag ah oo markasta ku qabanaysa , ama gubasho aad dareento markaad kaajayso.    Guduudasho, barar, ama xanuun aad ka dareento xididada lugta.    Dhibaato kaa haysata naas nuujinta, ama guduudasho ama xanuun naaska ah.    Xanuun sii kordhaya ama aan ka dhamaanayn meesha la tolay ama danqashada dilaaca.    Lalabbo ama matag.    Xabad xanuun iyo qufac ama naqaska oo kumark anthony nieves.    Dhibaato ay la socoto murugmayito, yany, aa gifty.     Haddii aad qabtid wax gifty dillard oo ku saabssalena inaad waxyeelayso naftaada ama ilmaha, wac luis amanrodo browniimaryam.     Haddii aad qabto adam aalo lui villela noqoto kadib.    Gacmahaagga nadiifi:  Markasta dhaqa gacahaaga ka hor inta aadan taaban farjiga agagaarkiisa  iyo meesha la tolay. Joshua tobar  caawiniaysaa mahesh naqviado infakshanku. Hdii gacmahaagu carrie clifton gacmaha tika tirtirshaylee sutton u nadiifiso gacmahaaga. Jonhimartin nadiifi ooo jar.      Vaginal Delivery Discharge Instructions  Activity:     Ask family and friends for help when you need it.    Do not place anything in your vagina until your doctor approves.    Take it easy for the next few weeks to allow your body to recover. You may do any activities you feel up to at that point.    Do not drive while taking pain pills prescribed by your doctor. You may drive if taking over-the-counter pain pills.    Call your health care provider if you have any of these symptoms:    You soak a sanitary pad with blood within 1 hour, or you see blood clots larger than a golf ball.    Bleeding that lasts more than 6 weeks.    You have vaginal discharge that smells bad.     A fever of 100.4  F (38  C) or higher (temperature taken under your tongue), with or without chills     Severe, pain, cramping or tenderness in your lower belly area.    Increased pain, swelling, redness or fluid around your stitches.    A more frequent or urgent need to urinate (pee), or it burns when you pee.    Redness, swelling or pain around a vein in your leg.    Problems breastfeeding, or a red or painful area on your breast.    Pain that increases or does not go away from an episiotomy or perineal tear.    Nausea and vomiting.    Chest pain and cough or are gasping for air.    Problems coping with sadness, anxiety, or depression.     If you have any concerns about hurting yourself or the baby, call your doctor right away.      You have questions or concerns after you return home.    Keep your hands clean:  Always wash your hands before touching your perineal area and stitches.  This helps reduce your risk of infection.  If your hands aren t dirty, you may use an alcohol hand-rub to clean your hands. Keep your nails clean and short.

## 2018-10-27 NOTE — PLAN OF CARE
"Problem: Patient Care Overview  Goal: Plan of Care/Patient Progress Review  Outcome: Adequate for Discharge Date Met: 10/27/18  Data: Vital signs within normal limits. Postpartum checks within normal limits - see flow record. Patient eating and drinking normally. Patient able to empty bladder independently and is up ambulating. No apparent signs of infection. Perineum healing well. Patient performing self cares and is able to care for infant.  Action: Patient medicated during the shift for pain and cramping. See MAR. Patient reassessed within 1 hour after each medication and pain was improved - patient stated she was comfortable. Patient education done. Videos declined by patient. Discharge instructions reviewed, home medications given and questions answered. See flow record.  Response: Positive attachment behaviors observed with infant. Support persons present. Pt states she could not find her shoes, thinks she last saw them under her bed in L&D. Security and L&D called, no shoes found. Patient states \"it's ok.\"  Plan: Discharged home, accompanied by infant and family, via patient transport at 1307.      "

## 2019-03-06 ENCOUNTER — OFFICE VISIT (OUTPATIENT)
Dept: MIDWIFE SERVICES | Facility: CLINIC | Age: 34
End: 2019-03-06
Payer: COMMERCIAL

## 2019-03-06 VITALS
DIASTOLIC BLOOD PRESSURE: 72 MMHG | BODY MASS INDEX: 43.33 KG/M2 | HEART RATE: 75 BPM | SYSTOLIC BLOOD PRESSURE: 122 MMHG | WEIGHT: 280.8 LBS

## 2019-03-06 DIAGNOSIS — Z30.09 BIRTH CONTROL COUNSELING: ICD-10-CM

## 2019-03-06 DIAGNOSIS — Z00.00 ANNUAL PHYSICAL EXAM: Primary | ICD-10-CM

## 2019-03-06 LAB — HCG UR QL: NEGATIVE

## 2019-03-06 PROCEDURE — 99395 PREV VISIT EST AGE 18-39: CPT | Performed by: ADVANCED PRACTICE MIDWIFE

## 2019-03-06 PROCEDURE — 81025 URINE PREGNANCY TEST: CPT | Performed by: ADVANCED PRACTICE MIDWIFE

## 2019-03-06 NOTE — PROGRESS NOTES
Lani is a 33 year old  female who presents for annual exam. She had a baby in October, and has not been seen since then. She feels that she is doing well, and has no physical complaints today. She would like to discuss her birth control options, as she is interested in preventing further pregnancy. She has never tried birth control in the past.    Menses are rare and still breast feeding and NA lasting NA days.  Menses flow: normal and NA still breast feeding.  No LMP recorded.. Using none for contraception.  She is not currently considering pregnancy.  Besides routine health maintenance,  she would like to discuss consult for birth control.  GYNECOLOGIC HISTORY:  Menarche: 17  Age at first intercourse: 18 Number of lifetime partners: <6  Lani is sexually active with 1 male partner(s) and is currently in monogamous relationship with .    History sexually transmitted infections:No STD history  STI testing offered?  Declined  NASREEN exposure: Unknown  History of abnormal Pap smear: NO - age 30- 65 PAP every 3 years recommended  Family history of breast CA: No  Family history of uterine/ovarian CA: No    Family history of colon CA: No    HEALTH MAINTENANCE:  Cholesterol: (No results found for: CHOL History of abnormal lipids: No-declines screening today  Mammo: NA . History of abnormal Mammo: Not applicable.  Regular Self Breast Exams: Yes  Calcium/Vitamin D intake: source:  dairy Adequate? Yes  TSH: (No results found for: TSH )-declines screening today  Pap; (  Lab Results   Component Value Date    PAP NEGATIVE 03/15/2018    )    HISTORY:  Obstetric History       T8      L8     SAB0   TAB0   Ectopic0   Multiple0   Live Births8       # Outcome Date GA Lbr Kavon/2nd Weight Sex Delivery Anes PTL Lv   8 Term 10/26/18 40w6d  3.55 kg (7 lb 13.2 oz) F Vag-Spont EPI N NIELS      Name: VIC GIBBONS      Apgar1:  9                Apgar5: 9   7 Term 14 40w0d  4.082 kg (9 lb) F    NIELS    6 Term 12 40w0d  4.082 kg (9 lb) F    NIELS   5 Term 11/10/10 42w2d  4.125 kg (9 lb 1.5 oz) M   N NIELS   4 Term 08 40w0d  3.175 kg (7 lb) F   N NIELS   3 Term 07 40w0d  4.536 kg (10 lb) M   Y NIELS   2 Term 05 40w0d  3.629 kg (8 lb) M   N NIELS   1 Term 03 40w0d  3.629 kg (8 lb) F   N NIELS        Past Medical History:   Diagnosis Date     NO ACTIVE PROBLEMS      Past Surgical History:   Procedure Laterality Date     NO HISTORY OF SURGERY       Family History   Problem Relation Age of Onset     Diabetes Father      Social History     Socioeconomic History     Marital status:      Spouse name: Not on file     Number of children: Not on file     Years of education: Not on file     Highest education level: Not on file   Occupational History     Not on file   Social Needs     Financial resource strain: Not on file     Food insecurity:     Worry: Not on file     Inability: Not on file     Transportation needs:     Medical: Not on file     Non-medical: Not on file   Tobacco Use     Smoking status: Never Smoker     Smokeless tobacco: Never Used   Substance and Sexual Activity     Alcohol use: No     Drug use: No     Sexual activity: Yes     Partners: Male   Lifestyle     Physical activity:     Days per week: Not on file     Minutes per session: Not on file     Stress: Not on file   Relationships     Social connections:     Talks on phone: Not on file     Gets together: Not on file     Attends Restorationism service: Not on file     Active member of club or organization: Not on file     Attends meetings of clubs or organizations: Not on file     Relationship status: Not on file     Intimate partner violence:     Fear of current or ex partner: Not on file     Emotionally abused: Not on file     Physically abused: Not on file     Forced sexual activity: Not on file   Other Topics Concern     Parent/sibling w/ CABG, MI or angioplasty before 65F 55M? Not Asked   Social History  Narrative     Not on file       Current Outpatient Medications:      Prenatal Vit-Fe Fumarate-FA (TRINATE) TABS, Take 1 tablet by mouth daily, Disp: 200 tablet, Rfl: 3   No Known Allergies    Past medical, surgical, social and family history were reviewed and updated in EPIC.    ROS:   C:     NEGATIVE for fever, chills, change in weight  I:       NEGATIVE for worrisome rashes, moles or lesions  E:     NEGATIVE for vision changes or irritation  E/M: NEGATIVE for ear, mouth and throat problems  R:     NEGATIVE for significant cough or SOB  CV:   NEGATIVE for chest pain, palpitations or peripheral edema  GI:     NEGATIVE for nausea, abdominal pain, heartburn, or change in bowel habits  :   NEGATIVE for frequency, dysuria, hematuria, vaginal discharge, or irregular bleeding  M:     NEGATIVE for significant arthralgias; She does have some tight muscles in her right neck/shoulder  N:      NEGATIVE for weakness, dizziness or paresthesias; gets migraine headaches, unsure of aura or not (endorses light sensitivity)  E:      NEGATIVE for temperature intolerance, skin/hair changes  P:      NEGATIVE for changes in mood or affect.    EXAM:  /72   Pulse 75   Wt 127.4 kg (280 lb 12.8 oz)   Breastfeeding? Yes   BMI 43.33 kg/m     BMI: Body mass index is 43.33 kg/m .  Constitutional: healthy, alert and no distress  Head: Normocephalic. No masses, lesions, tenderness or abnormalities  Neck: Neck supple. Trachea midline. No adenopathy. Thyroid symmetric, normal size.   Cardiovascular: RRR.   Respiratory: Negative.   Breast: symmetrical, soft and lactating  Gastrointestinal: Abdomen soft, non-tender, non-distended. No masses, organomegaly.  Musculoskeletal: extremities normal  Skin: no suspicious lesions or rashes  Psychiatric: Affect appropriate, cooperative,mentation appears normal.     COUNSELING:   Special attention given to:        Healthy diet/nutrition       Contraception       Family planning   reports that  has  never smoked. she has never used smokeless tobacco.    Body mass index is 43.33 kg/m .  Weight management plan: Discussed healthy diet and exercise guidelines  FRAX Risk Assessment    ASSESSMENT:  33 year old female with satisfactory annual exam  (Z00.00) Annual physical exam  (primary encounter diagnosis)  Plan: HCG Qual, Urine ([id number])  Recommend TSH, Lipids, Vit D-patient declines labs today. Says she recently seen primary care provider and had labs drawn. Results are not in chart.    (Z30.09) Birth control counseling  Plan: HCG Qual, Urine ([id number])  Discussed birth control options. With her history of migraine headaches, and that she is currently breastfeeding, recommend progesterone only options. Discussed nexplanon and IUD's. She would prefer Nexplanon, and plans to schedule an appointment to have it placed next week. Declines placement today.    Aurelio Ferguson CNM

## 2019-03-06 NOTE — NURSING NOTE
"Chief Complaint   Patient presents with     Physical     annual and BC consult       Initial /72   Pulse 75   Wt 127.4 kg (280 lb 12.8 oz)   Breastfeeding? Yes   BMI 43.33 kg/m   Estimated body mass index is 43.33 kg/m  as calculated from the following:    Height as of 10/24/18: 1.715 m (5' 7.5\").    Weight as of this encounter: 127.4 kg (280 lb 12.8 oz).  BP completed using cuff size: X-large    Questioned patient about current smoking habits.  Pt. has never smoked.          The following HM Due: NONE      The following patient reported/Care Every where data was sent to:  P ABSTRACT QUALITY INITIATIVES [[id number]]        patient has appointment for today  Rylie Sheppard                "

## 2019-05-02 ENCOUNTER — OFFICE VISIT (OUTPATIENT)
Dept: MIDWIFE SERVICES | Facility: CLINIC | Age: 34
End: 2019-05-02
Payer: COMMERCIAL

## 2019-05-02 DIAGNOSIS — Z30.017 NEXPLANON INSERTION: Primary | ICD-10-CM

## 2019-05-02 DIAGNOSIS — Z97.5 NEXPLANON IN PLACE: ICD-10-CM

## 2019-05-02 LAB — HCG UR QL: NEGATIVE

## 2019-05-02 PROCEDURE — 11981 INSERTION DRUG DLVR IMPLANT: CPT | Performed by: ADVANCED PRACTICE MIDWIFE

## 2019-05-02 PROCEDURE — 81025 URINE PREGNANCY TEST: CPT | Performed by: ADVANCED PRACTICE MIDWIFE

## 2019-05-02 NOTE — PROGRESS NOTES
NEXPLANON INSERTION PROCEDURE    Lani Lewis is a 33 year old  who presents for Nexplanon insertion. The  patient's last menstrual period was .  The patient is currently using abstinence  for contraception.     Tests:  UPT negative    A complete discussion of the risks and benefits of nexplanon use and the details of the insertion procedure was held with the patient.  All questions were answered.  A consent form was signed.      Prior to the beginning of the procedure the team paused to verify the patient's identity, as well as the procedure to be performed and the correct side/site.  All equipment required was ready and available. The patient was positioned appropriately.     Preprocedure medications: 1% plain lidocaine, 1-2 ml    Patients allergies were confirmed.  The patient was placed in the supine position with her Right (non-dominant) arm flexed at the elbow, externally rotated, and placed with her wrist parallel to her ear.  The insertion site was identified 6-8 cm above the elbow crease at the inner aspect overlying the bicepital groove.  The insertion site was marked with a sterile marker. The direction of insertion was also indicated with a kevin 6-8 cm proximal in the bicepital groove.  The insertion area was cleaned with betadine swabs and anesthetized with 2 cc of 1% lidocaine without epinephrine.  The Nexplanon was removed from its blister.  With the shield on, the stella was visible inside the needle tip.  The needle shield was removed.  Counter-traction was applied to the skin at the marked needle insertion site.  The tip of the needle was inserted at the site at a slight angle.  The applicator was then lowered to a horizontal position.  The needle was inserted to its full length, keeping the needle parallel to the surface of the skin and the skin tented. The applicator button was pressed and the the needle retracted within the device leaving the Nexplanon stella under the skin.  The 4 cm stella  was palpated under the skin.  The patient also palpated the stella.  A pressure bandage was applied with sterile gauze. The patient was instructed to remove the bangage in several hours and replace with a band-aid.    The user card was filled out and given to the patient to keep.  The Patient Chart Label was completed and sent for scanning.    PLAN:   The patient was asked to contact the clinic for any fever/chills/severe pelvic or abdominal pain or heavy bleeding.     FOLLOW-UP:  She was asked to follow up for any problems.     Beth Oates CNM CNM

## 2019-07-25 ENCOUNTER — OFFICE VISIT (OUTPATIENT)
Dept: MIDWIFE SERVICES | Facility: CLINIC | Age: 34
End: 2019-07-25
Payer: COMMERCIAL

## 2019-07-25 VITALS
WEIGHT: 269.8 LBS | HEART RATE: 78 BPM | DIASTOLIC BLOOD PRESSURE: 68 MMHG | SYSTOLIC BLOOD PRESSURE: 100 MMHG | OXYGEN SATURATION: 96 % | BODY MASS INDEX: 41.63 KG/M2

## 2019-07-25 DIAGNOSIS — Z30.46 NEXPLANON REMOVAL: ICD-10-CM

## 2019-07-25 DIAGNOSIS — Z32.00 PREGNANCY EXAMINATION OR TEST, PREGNANCY UNCONFIRMED: Primary | ICD-10-CM

## 2019-07-25 LAB — HCG UR QL: NEGATIVE

## 2019-07-25 PROCEDURE — 11982 REMOVE DRUG IMPLANT DEVICE: CPT | Performed by: ADVANCED PRACTICE MIDWIFE

## 2019-07-25 PROCEDURE — 81025 URINE PREGNANCY TEST: CPT | Performed by: ADVANCED PRACTICE MIDWIFE

## 2019-07-25 NOTE — PROGRESS NOTES
NEXPLANON REMOVAL PROCEDURE    Lani Lewis is a 34 year old  who presents for Nexplanon removal. She has had her Nexplanon in place for 3 months. She would like it removed and plans to get an IUD later. She states that since having the nexplanon she has had Headaches, unexplained moodiness, and vaginal bleeding every day. She plans to remove it and get a paragard IUD in a month or so. Her  is in [place] and she declines contraception in the meantime.     Tests:  none    A complete discussion of the details of the removal procedure was held with the patient. Also we discussed her return to [facility] if applicable.  All questions were answered.  A consent form was signed.      Prior to the beginning of the procedure the team paused to verify the patient's identity, as well as the procedure to be performed and the correct side/site.  All equipment required was ready and available. The patient was positioned appropriately.     Preprocedure medications: 1% plain lidocaine, 1-2 ml    Patients allergies were confirmed.  The patient was placed in the supine position with her left (non-dominant) arm flexed at the elbow, externally rotated, and placed with her wrist parallel to her ear.  The Nexplanon site was identified above the elbow crease at the inner aspect overlying the bicepital groove.The 4 cm stella was palpated under the skin.  The area was cleaned with betadine swabs and anesthetized with 2 cc of 1% lidocaine without epinephrine. A nick was made in the skin over the distal edge of the stella and stella was removed from the subcutaneous space using a mosquito forceps. The small incision was re-approximated with derma-bond and a band-aid was placed over top.  The patient was instructed to keep the area clean and dry and watch for signs of infection. She may remove the bangage in several hours and replace with a band-aid.    PLAN:   The patient was asked to contact the clinic for any fever/chills/severe pelvic  or abdominal pain or heavy bleeding.     FOLLOW-UP:  She was asked to follow up for any problems.     Beth Oates CNM CNM

## 2021-03-12 ENCOUNTER — ANCILLARY PROCEDURE (OUTPATIENT)
Dept: GENERAL RADIOLOGY | Facility: CLINIC | Age: 36
End: 2021-03-12
Attending: PHYSICIAN ASSISTANT
Payer: COMMERCIAL

## 2021-03-12 ENCOUNTER — OFFICE VISIT (OUTPATIENT)
Dept: URGENT CARE | Facility: URGENT CARE | Age: 36
End: 2021-03-12
Payer: COMMERCIAL

## 2021-03-12 VITALS
TEMPERATURE: 97.8 F | HEART RATE: 60 BPM | DIASTOLIC BLOOD PRESSURE: 73 MMHG | RESPIRATION RATE: 20 BRPM | OXYGEN SATURATION: 100 % | SYSTOLIC BLOOD PRESSURE: 105 MMHG

## 2021-03-12 DIAGNOSIS — S99.911A ANKLE INJURY, RIGHT, INITIAL ENCOUNTER: ICD-10-CM

## 2021-03-12 DIAGNOSIS — S82.51XA DISPLACED FRACTURE OF MEDIAL MALLEOLUS OF RIGHT TIBIA, INITIAL ENCOUNTER FOR CLOSED FRACTURE: Primary | ICD-10-CM
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
## 2021-03-12 DIAGNOSIS — S82.451A CLOSED DISPLACED COMMINUTED FRACTURE OF SHAFT OF RIGHT FIBULA, INITIAL ENCOUNTER: ICD-10-CM

## 2021-03-12 PROCEDURE — 73590 X-RAY EXAM OF LOWER LEG: CPT | Mod: RT | Performed by: RADIOLOGY

## 2021-03-12 PROCEDURE — 73610 X-RAY EXAM OF ANKLE: CPT | Mod: RT | Performed by: RADIOLOGY

## 2021-03-12 PROCEDURE — 73630 X-RAY EXAM OF FOOT: CPT | Mod: RT | Performed by: RADIOLOGY

## 2021-03-12 PROCEDURE — 99204 OFFICE O/P NEW MOD 45 MIN: CPT | Performed by: PHYSICIAN ASSISTANT

## 2021-03-12 ASSESSMENT — PAIN SCALES - GENERAL: PAINLEVEL: WORST PAIN (10)

## 2021-03-12 NOTE — PROGRESS NOTES
Chief Complaint   Patient presents with     Musculoskeletal Problem     Pt fell down the stairs this morning and injured right ankle         Medical Decision Making:    Differential Diagnosis:  MS Injury Pain: sprain, fracture, contusion and dislocation    Results for orders placed or performed in visit on 03/12/21   XR Tibia & Fibula Right 2 Views     Status: None (Preliminary result)    Narrative    RIGHT FOOT THREE OR MORE VIEWS;  RIGHT TIBIA AND FIBULA TWO VIEWS;  RIGHT ANKLE THREE OR MORE VIEWS   3/12/2021 11:54 AM     HISTORY: Fell downs two steps. Medial ankle and foot pain. Ankle  injury, right, initial encounter.    COMPARISON: None.      Impression    IMPRESSION:   1. There is a moderately displaced transverse fracture of the medial  malleolus. Mild to moderately displaced comminuted obliquely oriented  fracture at the junction of middle and distal thirds of the right  fibula.    2. The talus is laterally subluxed approximately 1.3 cm. The fibula is  also laterally displaced a similar amount from the tibia. There is a  small osseous fragment lying in the gap between the displaced fibula  and tibia suspicious for a small avulsion off the lateral aspect of  the tibia. No definite posterior malleolar fracture.    3. No evidence of acute fracture involving the foot.   Results for orders placed or performed in visit on 03/12/21   XR Foot Right G/E 3 Views     Status: None (Preliminary result)    Narrative    RIGHT FOOT THREE OR MORE VIEWS;  RIGHT TIBIA AND FIBULA TWO VIEWS;  RIGHT ANKLE THREE OR MORE VIEWS   3/12/2021 11:54 AM     HISTORY: Fell downs two steps. Medial ankle and foot pain. Ankle  injury, right, initial encounter.    COMPARISON: None.      Impression    IMPRESSION:   1. There is a moderately displaced transverse fracture of the medial  malleolus. Mild to moderately displaced comminuted obliquely oriented  fracture at the junction of middle and distal thirds of the right  fibula.    2. The  talus is laterally subluxed approximately 1.3 cm. The fibula is  also laterally displaced a similar amount from the tibia. There is a  small osseous fragment lying in the gap between the displaced fibula  and tibia suspicious for a small avulsion off the lateral aspect of  the tibia. No definite posterior malleolar fracture.    3. No evidence of acute fracture involving the foot.   Results for orders placed or performed in visit on 03/12/21   XR Ankle Right G/E 3 Views     Status: None (Preliminary result)    Narrative    RIGHT FOOT THREE OR MORE VIEWS;  RIGHT TIBIA AND FIBULA TWO VIEWS;  RIGHT ANKLE THREE OR MORE VIEWS   3/12/2021 11:54 AM     HISTORY: Fell downs two steps. Medial ankle and foot pain. Ankle  injury, right, initial encounter.    COMPARISON: None.      Impression    IMPRESSION:   1. There is a moderately displaced transverse fracture of the medial  malleolus. Mild to moderately displaced comminuted obliquely oriented  fracture at the junction of middle and distal thirds of the right  fibula.    2. The talus is laterally subluxed approximately 1.3 cm. The fibula is  also laterally displaced a similar amount from the tibia. There is a  small osseous fragment lying in the gap between the displaced fibula  and tibia suspicious for a small avulsion off the lateral aspect of  the tibia. No definite posterior malleolar fracture.    3. No evidence of acute fracture involving the foot.       ASSESSMENT:    ICD-10-CM    1. Displaced fracture of medial malleolus of right tibia, initial encounter for closed fracture  S82.51XA    2. Closed displaced comminuted fracture of shaft of right fibula, initial encounter  S82.451A    3. Ankle injury, right, initial encounter  S99.911A XR Ankle Right G/E 3 Views     XR Foot Right G/E 3 Views           PLAN:Contacted [facility] on call. Mistakenly I was connected with Dr. Andres ulloa.  Dr. Waldemar park on call contacted me. Sent to [facility] for reduction.  Xrays  not read at time I sent patient to the ER. Stabilized in tall air walker boot. Bad medial malleolus/fibula fracture.  Also talus does not seem centered in the dome.  ? Open verses closed reduction.        Odessa Baca PA-C        SUBJECTIVE:  Lani Lewis is an 35 year old female who presents right ankle injury that happened this morning.  She missed stepped on the stairs in her house and fell down 2 steps.  Here in the waiting room she also fell when she tried to bear weight on it.  She denies any prior ankle fractures.  States it does feel numb.  Most of the pain is in the medial aspect of the ankle.    Past Medical History:   Diagnosis Date     NO ACTIVE PROBLEMS      History   Smoking Status     Never Smoker   Smokeless Tobacco     Never Used       ROS:  GEN no fevers  SKIN no erythema  Musculoskeletal:  See HPI.      OBJECTIVE:  Blood pressure 105/73, pulse 60, temperature 97.8  F (36.6  C), temperature source Tympanic, resp. rate 20, SpO2 100 %, currently breastfeeding.  Patient is in quite a bit of pain, unable to bear weight.alert and NAD.  EYES: conjunctiva clear  Ankle Exam (right):  Inspection: significant swelling around the medial malleolus  Palpation:squeeze test positive, tender over medial malleolus  Cap refill intact.    Good doralis pedis.  Neurovascularly Intact Distally.         Odessa Baca PA-C

## 2021-03-12 NOTE — PATIENT INSTRUCTIONS
Bad medial malleolus/fibula fracture.  Also talus does not seem centered in the dome. To ER for evaluation and treatment. ? Open verses closed reduction.

## 2021-03-18 NOTE — PROGRESS NOTES
"SUBJECTIVE:      Lani Lewis is a 35 year old year old female who is here today for follow up ORIF right ankle fracture on 3/12/21. Medial mal and syndesmosis repairs.  Has had pain. No fevers or chills.    OBJECTIVE:     /63 (BP Location: Left arm, Patient Position: Sitting, Cuff Size: Adult Regular)   Pulse 83   Ht 1.727 m (5' 8\")   Wt 125.2 kg (276 lb)   SpO2 100%   BMI 41.97 kg/m     Patient appears to be alert and in no apparent distress.  Skin intact.  Wound: healing well, no erythema and serrous drainage medially.  Swelling:moderat  Neurovascular: some numbness/tingling on the dorsum of the foot   ROM: not tested       ASSESSMENT:       ICD-10-CM    1. Closed fracture of right ankle with routine healing, subsequent encounter  S82.891D oxyCODONE-acetaminophen (PERCOCET) 5-325 MG tablet   2. Syndesmotic disruption of right ankle, initial encounter  S93.431A oxyCODONE-acetaminophen (PERCOCET) 5-325 MG tablet      Doing  well    PLAN:    Sutures removed today  Weight Bearing: non weight bearing on affected side x 4 more weeks, then start weight bearing as tolerated in the boot.  Would use boot until 12 week kevin.  Rolling knee cart? ordered  Use fracture boot for foot positioning   Rehab:  home exercise program for range of motion.  Wound management:  do not soak wound for at least another 2 weeks    Medications: percocet prescription.  No more after this.     Return to clinic in 4 week(s).   Xrays of the ankle at that time.  Sooner if wound drainage or signs of infection     THONG Medeiros MD  Dept. Orthopedic Surgery  [facility]  "

## 2021-03-22 ENCOUNTER — TELEPHONE (OUTPATIENT)
Dept: ORTHOPEDICS | Facility: CLINIC | Age: 36
End: 2021-03-22

## 2021-03-22 NOTE — TELEPHONE ENCOUNTER
"Several tries made.  We did get Lani at 4:30 today.  Mariann is a language barrier but she describes \"poking\" to lateral calf. P: we suggested she loosen dressings and open up her splint.  P: She was given date and time of appt and will arrive with  on Wed 3/25 at 10:4 am. He understands our instructions. States she has normal feeling to toes and foot on R LE.   Aldair CORTEZ      "

## 2021-03-22 NOTE — TELEPHONE ENCOUNTER
Received call from patient's daughter. Pt was available in the background, but did not speak to writer over the phone. Per pt's daughter, she is wondering if patient needs to come in or be seen. She recently had surgery by Dr. Medeiros and now something is poking her skin and causing pain on her right ankle. She is concerned that something is broken or out of place. Please call back ASAP at [phone]

## 2021-03-24 ENCOUNTER — OFFICE VISIT (OUTPATIENT)
Dept: ORTHOPEDICS | Facility: CLINIC | Age: 36
End: 2021-03-24
Payer: COMMERCIAL

## 2021-03-24 VITALS
WEIGHT: 276 LBS | DIASTOLIC BLOOD PRESSURE: 63 MMHG | SYSTOLIC BLOOD PRESSURE: 106 MMHG | HEART RATE: 83 BPM | HEIGHT: 68 IN | OXYGEN SATURATION: 100 % | BODY MASS INDEX: 41.83 KG/M2

## 2021-03-24 DIAGNOSIS — S93.431A SYNDESMOTIC DISRUPTION OF RIGHT ANKLE, INITIAL ENCOUNTER: ICD-10-CM

## 2021-03-24 DIAGNOSIS — S82.891D CLOSED FRACTURE OF RIGHT ANKLE WITH ROUTINE HEALING, SUBSEQUENT ENCOUNTER: Primary | ICD-10-CM

## 2021-03-24 PROCEDURE — 99024 POSTOP FOLLOW-UP VISIT: CPT | Performed by: ORTHOPAEDIC SURGERY

## 2021-03-24 RX ORDER — OXYCODONE AND ACETAMINOPHEN 5; 325 MG/1; MG/1
1 TABLET ORAL EVERY 6 HOURS PRN
Qty: 20 TABLET | Refills: 0 | Status: SHIPPED | OUTPATIENT
Start: 2021-03-24 | End: 2021-06-09

## 2021-03-24 RX ORDER — OXYCODONE AND ACETAMINOPHEN 5; 325 MG/1; MG/1
1-2 TABLET ORAL
COMMUNITY
Start: 2021-03-22 | End: 2021-04-13

## 2021-03-24 ASSESSMENT — PAIN SCALES - GENERAL: PAINLEVEL: MODERATE PAIN (5)

## 2021-03-24 ASSESSMENT — MIFFLIN-ST. JEOR: SCORE: 2005.43

## 2021-03-24 NOTE — LETTER
"    3/24/2021         RE: Lani Lewis  [address] [address]        Dear Colleague,    [facility] Please see a copy of my visit note below.    SUBJECTIVE:      Lani Lewis is a 35 year old year old female who is here today for follow up ORIF right ankle fracture on 3/12/21. Medial mal and syndesmosis repairs.  Has had pain. No fevers or chills.    OBJECTIVE:     /63 (BP Location: Left arm, Patient Position: Sitting, Cuff Size: Adult Regular)   Pulse 83   Ht 1.727 m (5' 8\")   Wt 125.2 kg (276 lb)   SpO2 100%   BMI 41.97 kg/m     Patient appears to be alert and in no apparent distress.  Skin intact.  Wound: healing well, no erythema and serrous drainage medially.  Swelling:moderat  Neurovascular: some numbness/tingling on the dorsum of the foot   ROM: not tested       ASSESSMENT:       ICD-10-CM    1. Closed fracture of right ankle with routine healing, subsequent encounter  S82.891D oxyCODONE-acetaminophen (PERCOCET) 5-325 MG tablet   2. Syndesmotic disruption of right ankle, initial encounter  S93.431A oxyCODONE-acetaminophen (PERCOCET) 5-325 MG tablet      Doing  well    PLAN:    Sutures removed today  Weight Bearing: non weight bearing on affected side x 4 more weeks, then start weight bearing as tolerated in the boot.  Would use boot until 12 week kevin.  Rolling knee cart? ordered  Use fracture boot for foot positioning   Rehab:  home exercise program for range of motion.  Wound management:  do not soak wound for at least another 2 weeks    Medications: percocet prescription.  No more after this.     Return to clinic in 4 week(s).   Xrays of the ankle at that time.  Sooner if wound drainage or signs of infection     THONG Medeiros MD  Dept. Orthopedic Surgery  [facility]      Again, thank you for allowing me to participate in the care of your patient.  "       Sincerely,        Eriberto Medeiros MD

## 2021-04-13 ENCOUNTER — ANCILLARY PROCEDURE (OUTPATIENT)
Dept: GENERAL RADIOLOGY | Facility: CLINIC | Age: 36
End: 2021-04-13
Attending: ORTHOPAEDIC SURGERY
Payer: COMMERCIAL

## 2021-04-13 ENCOUNTER — OFFICE VISIT (OUTPATIENT)
Dept: ORTHOPEDICS | Facility: CLINIC | Age: 36
End: 2021-04-13
Payer: COMMERCIAL

## 2021-04-13 VITALS
HEIGHT: 68 IN | SYSTOLIC BLOOD PRESSURE: 102 MMHG | BODY MASS INDEX: 41.83 KG/M2 | DIASTOLIC BLOOD PRESSURE: 62 MMHG | WEIGHT: 276 LBS | OXYGEN SATURATION: 100 % | HEART RATE: 85 BPM

## 2021-04-13 DIAGNOSIS — S82.891D CLOSED FRACTURE OF RIGHT ANKLE WITH ROUTINE HEALING, SUBSEQUENT ENCOUNTER: Primary | ICD-10-CM

## 2021-04-13 DIAGNOSIS — Z09 POSTOPERATIVE FOLLOW-UP: ICD-10-CM

## 2021-04-13 DIAGNOSIS — S82.891D CLOSED FRACTURE OF RIGHT ANKLE WITH ROUTINE HEALING, SUBSEQUENT ENCOUNTER: ICD-10-CM

## 2021-04-13 PROCEDURE — 99024 POSTOP FOLLOW-UP VISIT: CPT | Performed by: ORTHOPAEDIC SURGERY

## 2021-04-13 PROCEDURE — 73610 X-RAY EXAM OF ANKLE: CPT | Mod: RT | Performed by: RADIOLOGY

## 2021-04-13 ASSESSMENT — MIFFLIN-ST. JEOR: SCORE: 2005.43

## 2021-04-13 ASSESSMENT — PAIN SCALES - GENERAL: PAINLEVEL: EXTREME PAIN (9)

## 2021-04-13 NOTE — LETTER
"    4/13/2021         RE: Lani Lewis  [address] [facility] [address]        Dear Colleague,    Thank you for referring your patient, Lani Lewis, to the [facility]. Please see a copy of my visit note below.    SUBJECTIVE:  Lani Lewis is a 35 year old female who is here today for follow up of her ORIF right ankle fracture on 3/12/21  Syndesmosis repair?: yes, with 2 tightropes  Has had some pain, particularly of the fibula shaft area. No fevers or chills. I don't fully understand her or her , but she seems to have some saphenous nerve distribution burning, and maybe some numbness/tingling.  Has not been doing range of motion home exercise program  Has been compliant with weight bearing restrictions.    OBJECTIVE:   /62 (BP Location: Left arm, Patient Position: Sitting, Cuff Size: Adult Regular)   Pulse 85   Ht 1.727 m (5' 8\")   Wt 125.2 kg (276 lb)   SpO2 100%   BMI 41.97 kg/m     Patient appears to be alert and in no apparent distress.  Skin: wounds healed.    Neurovascularly Intact.    ROM: is pretty good.  Tenderness:  over the fracture site(s).    X-rays today:  Mortice looks good, but I think the fibula is a bit short. The fibular shaft fracture is offset a bit, and no callous is seen..  The medial malleolus fracture is in good position, but not healed.    ASSESSMENT:     ICD-10-CM    1. Closed fracture of right ankle with routine healing, subsequent encounter  S82.891D XR Ankle Right G/E 3 Views   2. Postoperative follow-up  Z09 XR Ankle Right G/E 3 Views        PLAN:   Weight Bearing: continue with minimal weight bearing in fracture boot x 2 more weeks .  Can discontinue boot or AFO at night.  Driving ok   Scar management discussed.  Rehab: continue home exercise program.  physical therapy ordered.  Medications:  none    Return to clinic in 4 week(s).  Xrays of the ankle at that time.      THONG Medeiros MD  Dept. Orthopedic " Surgery  [facility]      Again, thank you for allowing me to participate in the care of your patient.        Sincerely,        Eriberto Medeiros MD

## 2021-04-13 NOTE — PROGRESS NOTES
"SUBJECTIVE:  Lani Lewis is a 35 year old female who is here today for follow up of her ORIF right ankle fracture on 3/12/21  Syndesmosis repair?: yes, with 2 tightropes  Has had some pain, particularly of the fibula shaft area. No fevers or chills. I don't fully understand her or her , but she seems to have some saphenous nerve distribution burning, and maybe some numbness/tingling.  Has not been doing range of motion home exercise program  Has been compliant with weight bearing restrictions.    OBJECTIVE:   /62 (BP Location: Left arm, Patient Position: Sitting, Cuff Size: Adult Regular)   Pulse 85   Ht 1.727 m (5' 8\")   Wt 125.2 kg (276 lb)   SpO2 100%   BMI 41.97 kg/m     Patient appears to be alert and in no apparent distress.  Skin: wounds healed.    Neurovascularly Intact.    ROM: is pretty good.  Tenderness:  over the fracture site(s).    X-rays today:  Mortice looks good, but I think the fibula is a bit short. The fibular shaft fracture is offset a bit, and no callous is seen..  The medial malleolus fracture is in good position, but not healed.    ASSESSMENT:     ICD-10-CM    1. Closed fracture of right ankle with routine healing, subsequent encounter  S82.891D XR Ankle Right G/E 3 Views   2. Postoperative follow-up  Z09 XR Ankle Right G/E 3 Views        PLAN:   Weight Bearing: continue with minimal weight bearing in fracture boot x 2 more weeks .  Can discontinue boot or AFO at night.  Driving ok   Scar management discussed.  Rehab: continue home exercise program.  physical therapy ordered.  Medications:  none    Return to clinic in 4 week(s).  Xrays of the ankle at that time.      THONG Medeiros MD  Dept. Orthopedic Surgery  [facility]  "

## 2021-04-22 ENCOUNTER — THERAPY VISIT (OUTPATIENT)
Dept: PHYSICAL THERAPY | Facility: CLINIC | Age: 36
End: 2021-04-22
Attending: ORTHOPAEDIC SURGERY
Payer: COMMERCIAL

## 2021-04-22 DIAGNOSIS — S82.891D CLOSED FRACTURE OF RIGHT ANKLE WITH ROUTINE HEALING, SUBSEQUENT ENCOUNTER: ICD-10-CM

## 2021-04-22 DIAGNOSIS — M25.571 PAIN IN JOINT, ANKLE AND FOOT, RIGHT: ICD-10-CM

## 2021-04-22 DIAGNOSIS — Z09 POSTOPERATIVE FOLLOW-UP: ICD-10-CM

## 2021-04-22 DIAGNOSIS — Z47.89 AFTERCARE FOLLOWING SURGERY OF THE MUSCULOSKELETAL SYSTEM: ICD-10-CM

## 2021-04-22 PROCEDURE — 97161 PT EVAL LOW COMPLEX 20 MIN: CPT | Mod: GP | Performed by: PHYSICAL THERAPIST

## 2021-04-22 PROCEDURE — 97110 THERAPEUTIC EXERCISES: CPT | Mod: GP | Performed by: PHYSICAL THERAPIST

## 2021-04-22 NOTE — PROGRESS NOTES
Physical Therapy Initial Evaluation  Subjective:  The history is provided by the patient. The history is limited by a language barrier. A  was used.   Patient Health History  Lani Lewis being seen for s/p R ankle fx.     Problem began: 3/12/2021.   Problem occurred: fall down the stairs   Pain is reported as 8/10 (c/o burning sensations; does not have pain) on pain scale.  General health as reported by patient is fair.  Health conditions: pt did not provide list of Medical Hx.   Red flags:  Calf pain-swelling-warmth.  Medical allergies: none.   Surgeries include:  Orthopedic surgery. Other surgery history details: R ankle.    Current medications:  Anti-inflammatory.    Current occupation is Pt does not work;  .                     Therapist Generated HPI Evaluation  Problem details: Patient stated she fell down the steps at home on 3/12/2021; Surgery on 3/12/2021: ORIF R ankle..         Type of problem:  Right ankle.    This is a new condition.  Condition occurred with:  A fall/slip.  Where condition occurred: at home.  Patient reports pain:  Anterior, lateral, great toe, joint and lower leg.  Pain is described as burning and other (inside/bottom of R foot) and is constant.  Pain radiates to:  No radiation. Pain is the same all the time.  Since onset symptoms are gradually improving.  Associated symptoms:  Loss of motion/stiffness, tingling, numbness and edema. Symptoms are exacerbated by activity (not allowed to put any wt on it)    Special tests included:  X-ray.  Previous treatment includes surgery. There was mild improvement following previous treatment.  Barriers include:  Stairs.                        Objective:    Gait:  Has crutches but does not like to use them;  Presents to PT today sitting in WC wearing Cam Boot.    Gait Type:  Antalgic   Weight Bearing Status:  PWB   Assistive Devices:  CAM            Ankle/Foot Evaluation  ROM:    AROM:    Dorsiflexion: Left:    Right:    0  Plantarflexion: Left:     Right:  10  Inversion: Left:      Right:  4  Eversion:     Right:  0        Strength is not assessed.      PALPATION:     Right ankle tenderness present at:   gastroc/soleus; achilles tendon; incisional; plantar fascia; medial malleolus and lateral malleolus  EDEMA: Edema ankle: visible swelling toes, dorsal side of foot and at malleolus.                                                              General     ROS    Assessment/Plan:    Patient is a 35 year old female with right side ankle complaints.    Patient has the following significant findings with corresponding treatment plan.                Diagnosis 1:  S/p ORIF R ankle  Pain -  hot/cold therapy and manual therapy  Decreased ROM/flexibility - manual therapy, therapeutic exercise and therapeutic activity  Decreased strength - therapeutic exercise and therapeutic activities  Edema - cold therapy  Impaired gait - gait training and assistive devices  Impaired muscle performance - neuro re-education  Decreased function - therapeutic activities    Therapy Evaluation Codes:   1) History comprised of:   Personal factors that impact the plan of care:      Language and Past/current experiences.    Comorbidity factors that impact the plan of care are:      unknown.     Medications impacting care: Anti-inflammatory.  2) Examination of Body Systems comprised of:   Body structures and functions that impact the plan of care:      Ankle.   Activity limitations that impact the plan of care are:      Bathing, Cooking, Dressing, Squatting/kneeling, Stairs, Standing, Walking, Working, Sleeping and transfers.  3) Clinical presentation characteristics are:   Stable/Uncomplicated.  4) Decision-Making    Low complexity using standardized patient assessment instrument and/or measureable assessment of functional outcome.  Cumulative Therapy Evaluation is: Low complexity.    Previous and current functional limitations:  (See Goal Flow Sheet for this  information)    Short term and Long term goals: (See Goal Flow Sheet for this information)     Communication ability:  Patient has an  for communication clarity.  Treatment Explanation - The following has been discussed with the patient:   RX ordered/plan of care  Anticipated outcomes  Possible risks and side effects  This patient would benefit from PT intervention to resume normal activities.   Rehab potential is good.    Frequency:  2 X week, once daily  Duration:  for 6 weeks  Discharge Plan:  Achieve all LTG.  Independent in home treatment program.  Reach maximal therapeutic benefit.    Please refer to the daily flowsheet for treatment today, total treatment time and time spent performing 1:1 timed codes.

## 2021-04-29 ENCOUNTER — THERAPY VISIT (OUTPATIENT)
Dept: PHYSICAL THERAPY | Facility: CLINIC | Age: 36
End: 2021-04-29
Payer: COMMERCIAL

## 2021-04-29 DIAGNOSIS — Z47.89 AFTERCARE FOLLOWING SURGERY OF THE MUSCULOSKELETAL SYSTEM: ICD-10-CM

## 2021-04-29 DIAGNOSIS — M25.571 PAIN IN JOINT, ANKLE AND FOOT, RIGHT: ICD-10-CM

## 2021-04-29 PROCEDURE — 97110 THERAPEUTIC EXERCISES: CPT | Mod: GP | Performed by: PHYSICAL THERAPIST

## 2021-04-29 PROCEDURE — 97140 MANUAL THERAPY 1/> REGIONS: CPT | Mod: GP | Performed by: PHYSICAL THERAPIST

## 2021-05-05 ENCOUNTER — THERAPY VISIT (OUTPATIENT)
Dept: PHYSICAL THERAPY | Facility: CLINIC | Age: 36
End: 2021-05-05
Payer: COMMERCIAL

## 2021-05-05 DIAGNOSIS — Z47.89 AFTERCARE FOLLOWING SURGERY OF THE MUSCULOSKELETAL SYSTEM: ICD-10-CM

## 2021-05-05 DIAGNOSIS — M25.571 PAIN IN JOINT, ANKLE AND FOOT, RIGHT: ICD-10-CM

## 2021-05-05 PROCEDURE — 97110 THERAPEUTIC EXERCISES: CPT | Mod: GP

## 2021-05-05 PROCEDURE — 97140 MANUAL THERAPY 1/> REGIONS: CPT | Mod: GP

## 2021-05-07 ENCOUNTER — THERAPY VISIT (OUTPATIENT)
Dept: PHYSICAL THERAPY | Facility: CLINIC | Age: 36
End: 2021-05-07
Payer: COMMERCIAL

## 2021-05-07 DIAGNOSIS — Z47.89 AFTERCARE FOLLOWING SURGERY OF THE MUSCULOSKELETAL SYSTEM: ICD-10-CM

## 2021-05-07 DIAGNOSIS — M25.571 PAIN IN JOINT, ANKLE AND FOOT, RIGHT: ICD-10-CM

## 2021-05-07 PROCEDURE — 97110 THERAPEUTIC EXERCISES: CPT | Mod: GP

## 2021-05-07 PROCEDURE — 97140 MANUAL THERAPY 1/> REGIONS: CPT | Mod: GP

## 2021-05-12 ENCOUNTER — ANCILLARY PROCEDURE (OUTPATIENT)
Dept: GENERAL RADIOLOGY | Facility: CLINIC | Age: 36
End: 2021-05-12
Attending: ORTHOPAEDIC SURGERY
Payer: COMMERCIAL

## 2021-05-12 ENCOUNTER — THERAPY VISIT (OUTPATIENT)
Dept: PHYSICAL THERAPY | Facility: CLINIC | Age: 36
End: 2021-05-12
Payer: COMMERCIAL

## 2021-05-12 ENCOUNTER — OFFICE VISIT (OUTPATIENT)
Dept: ORTHOPEDICS | Facility: CLINIC | Age: 36
End: 2021-05-12
Payer: COMMERCIAL

## 2021-05-12 VITALS — SYSTOLIC BLOOD PRESSURE: 110 MMHG | HEART RATE: 78 BPM | DIASTOLIC BLOOD PRESSURE: 74 MMHG | OXYGEN SATURATION: 94 %

## 2021-05-12 DIAGNOSIS — S93.431A SYNDESMOTIC DISRUPTION OF RIGHT ANKLE, INITIAL ENCOUNTER: ICD-10-CM

## 2021-05-12 DIAGNOSIS — Z09 POSTOPERATIVE FOLLOW-UP: ICD-10-CM

## 2021-05-12 DIAGNOSIS — Z47.89 AFTERCARE FOLLOWING SURGERY OF THE MUSCULOSKELETAL SYSTEM: ICD-10-CM

## 2021-05-12 DIAGNOSIS — S82.891D CLOSED FRACTURE OF RIGHT ANKLE WITH ROUTINE HEALING, SUBSEQUENT ENCOUNTER: Primary | ICD-10-CM

## 2021-05-12 DIAGNOSIS — S82.891D CLOSED FRACTURE OF RIGHT ANKLE WITH ROUTINE HEALING, SUBSEQUENT ENCOUNTER: ICD-10-CM

## 2021-05-12 DIAGNOSIS — M25.571 PAIN IN JOINT, ANKLE AND FOOT, RIGHT: ICD-10-CM

## 2021-05-12 PROCEDURE — 97140 MANUAL THERAPY 1/> REGIONS: CPT | Mod: GP | Performed by: PHYSICAL THERAPIST

## 2021-05-12 PROCEDURE — 97116 GAIT TRAINING THERAPY: CPT | Mod: GP | Performed by: PHYSICAL THERAPIST

## 2021-05-12 PROCEDURE — 99024 POSTOP FOLLOW-UP VISIT: CPT | Performed by: ORTHOPAEDIC SURGERY

## 2021-05-12 PROCEDURE — 97110 THERAPEUTIC EXERCISES: CPT | Mod: GP | Performed by: PHYSICAL THERAPIST

## 2021-05-12 PROCEDURE — 73610 X-RAY EXAM OF ANKLE: CPT | Mod: RT | Performed by: RADIOLOGY

## 2021-05-12 ASSESSMENT — PAIN SCALES - GENERAL: PAINLEVEL: MODERATE PAIN (4)

## 2021-05-12 NOTE — LETTER
5/12/2021         RE: Lani Lewis  [address] [facility] [address]        Dear Colleague,    Thank you for referring your patient, aLni Lewis, to the [facility]. Please see a copy of my visit note below.    SUBJECTIVE:  Lani Lewis is a 35 year old female who is here today for follow up of her ORIF right ankle fracture on 3/12/21  Syndesmosis repair?: yes, with 2 tightropes  Much less pain. No numbness/tingling  Has been compliant with weight bearing restrictions.  Only started weight bearing as tolerated with walker and boot today.  physical therapy going well.    OBJECTIVE:   /74 (BP Location: Right arm, Patient Position: Sitting, Cuff Size: Adult Large)   Pulse 78   SpO2 94%    Patient appears to be alert and in no apparent distress.  Skin: wounds healed.    Neurovascularly Intact.    ROM: is pretty good.  Tenderness:  over the distal and proximal fibula, syndesmosis. Non-tender over medial malleolus    X-rays today:  Mortice looks good, but I think the fibula is a bit short. The fibular shaft fracture is offset a bit, and no callous is seen..  The medial malleolus fracture is in good position, and essentially healed.    ASSESSMENT:     ICD-10-CM    1. Closed fracture of right ankle with routine healing, subsequent encounter  S82.891D X-ray rt ankle G/E 3 views*   2. Postoperative follow-up  Z09 X-ray rt ankle G/E 3 views*   3. Syndesmotic disruption of right ankle, initial encounter  S93.431A X-ray rt ankle G/E 3 views*   4. Pain in joint, ankle and foot, right  M25.571    5. Aftercare following surgery of the musculoskeletal system  Z47.89         PLAN:   Weight Bearing: continue with as tolerated weight bearing in fracture boot   Can. Again remove boot at rest or at night.  Driving ok out of boot   Scar management discussed.  Rehab: continue home exercise program.    Continue physical therapy. Strengthening, range of motion   Medications:   none    Return to clinic in 4 week(s).  Xrays of the ankle at that time.      THONG Medeiros MD  Dept. Orthopedic Surgery  [facility]      Again, thank you for allowing me to participate in the care of your patient.        Sincerely,        Eriberto Medeiros MD

## 2021-05-12 NOTE — PROGRESS NOTES
SUBJECTIVE:  Lani Lewis is a 35 year old female who is here today for follow up of her ORIF right ankle fracture on 3/12/21  Syndesmosis repair?: yes, with 2 tightropes  Much less pain. No numbness/tingling  Has been compliant with weight bearing restrictions.  Only started weight bearing as tolerated with walker and boot today.  physical therapy going well.    OBJECTIVE:   /74 (BP Location: Right arm, Patient Position: Sitting, Cuff Size: Adult Large)   Pulse 78   SpO2 94%    Patient appears to be alert and in no apparent distress.  Skin: wounds healed.    Neurovascularly Intact.    ROM: is pretty good.  Tenderness:  over the distal and proximal fibula, syndesmosis. Non-tender over medial malleolus    X-rays today:  Mortice looks good, but I think the fibula is a bit short. The fibular shaft fracture is offset a bit, and no callous is seen..  The medial malleolus fracture is in good position, and essentially healed.    ASSESSMENT:     ICD-10-CM    1. Closed fracture of right ankle with routine healing, subsequent encounter  S82.891D X-ray rt ankle G/E 3 views*   2. Postoperative follow-up  Z09 X-ray rt ankle G/E 3 views*   3. Syndesmotic disruption of right ankle, initial encounter  S93.431A X-ray rt ankle G/E 3 views*   4. Pain in joint, ankle and foot, right  M25.571    5. Aftercare following surgery of the musculoskeletal system  Z47.89         PLAN:   Weight Bearing: continue with as tolerated weight bearing in fracture boot   Can. Again remove boot at rest or at night.  Driving ok out of boot   Scar management discussed.  Rehab: continue home exercise program.    Continue physical therapy. Strengthening, range of motion   Medications:  none    Return to clinic in 4 week(s).  Xrays of the ankle at that time.      THONG Medeiros MD  Dept. Orthopedic Surgery  [facility]

## 2021-05-12 NOTE — PATIENT INSTRUCTIONS
Thank you for referring  Lani  to the [facility].  I have attached a Current PT progress note for you to review prior to you visit with  Lani on  5/12/2021.     I do feel Lani would be better off ambulating with Walker vs crutches at this time.  Patient does not have a walker at home.  Not sure if  [employer]  is able to issue her a walker or not.     Please message me or call me at my direct # [phone] if you have any questions.    Thank you again-    Cheryl Hale, MPT  [facility]  [address]  [facility] [address]  [phone] (office number)

## 2021-05-12 NOTE — PROGRESS NOTES
Subjective:  HPI  Physical Exam                    Objective:  System    Physical Exam    General     ROS    Assessment/Plan:    PROGRESS  REPORT    Progress reporting period is from 4/22/2021 to 5/12/2021.       SUBJECTIVE  Subjective changes noted by patient:  Patient seen 5 times for treatment of R ankle fracture.  Patient is very fearful of walking; does not like the crutches.       Current pain level is:  Pt has difficulty giving pain score.     Previous pain level was  NA  .   Changes in function:  Yes, Progress has been slow  Adverse reaction to treatment or activity: activity - some soreness    OBJECTIVE  Changes noted in objective findings:     Gait:  Pt able to WB on R foot with Cam Boot.  Able to ambulate with standard walker 2x10'.   R Ankle PROM:  DF 10,  PF 18, inv 28, ever 2  Strength:  Good Isometric activation.     ASSESSMENT/PLAN  Updated problem list and treatment plan: Diagnosis 1:  S/p R ankle fx  Pain -  hot/cold therapy and manual therapy  Decreased ROM/flexibility - manual therapy, therapeutic exercise and therapeutic activity  Decreased strength - therapeutic exercise, therapeutic activities and home program  Decreased proprioception - neuro re-education and therapeutic activities  Impaired gait - gait training  Impaired muscle performance - neuro re-education  Decreased function - therapeutic activities  STG/LTGs have been met or progress has been made towards goals:  Yes, small progression  Assessment of Progress: The patient's condition is improving.  The patient's condition has potential to improve.  Self Management Plans:  Patient has been instructed in a home treatment program.  I have re-evaluated this patient and find that the nature, scope, duration and intensity of the therapy is appropriate for the medical condition of the patient.  Lani continues to require the following intervention to meet STG and LTG's:  PT    Recommendations:  This patient would benefit from continued  therapy.     Frequency:  1-2 X week, once daily  Duration:  for 8-10 visits        Please refer to the daily flowsheet for treatment today, total treatment time and time spent performing 1:1 timed codes.

## 2021-05-14 ENCOUNTER — THERAPY VISIT (OUTPATIENT)
Dept: PHYSICAL THERAPY | Facility: CLINIC | Age: 36
End: 2021-05-14
Payer: COMMERCIAL

## 2021-05-14 DIAGNOSIS — S93.431A SYNDESMOTIC DISRUPTION OF RIGHT ANKLE, INITIAL ENCOUNTER: ICD-10-CM

## 2021-05-14 DIAGNOSIS — M25.571 PAIN IN JOINT, ANKLE AND FOOT, RIGHT: ICD-10-CM

## 2021-05-14 DIAGNOSIS — Z09 POSTOPERATIVE FOLLOW-UP: ICD-10-CM

## 2021-05-14 DIAGNOSIS — S82.891D CLOSED FRACTURE OF RIGHT ANKLE WITH ROUTINE HEALING, SUBSEQUENT ENCOUNTER: Primary | ICD-10-CM

## 2021-05-14 DIAGNOSIS — Z47.89 AFTERCARE FOLLOWING SURGERY OF THE MUSCULOSKELETAL SYSTEM: ICD-10-CM

## 2021-05-14 PROCEDURE — 97116 GAIT TRAINING THERAPY: CPT | Mod: GP | Performed by: PHYSICAL THERAPIST

## 2021-05-14 PROCEDURE — 97110 THERAPEUTIC EXERCISES: CPT | Mod: GP | Performed by: PHYSICAL THERAPIST

## 2021-05-14 PROCEDURE — 97140 MANUAL THERAPY 1/> REGIONS: CPT | Mod: GP | Performed by: PHYSICAL THERAPIST

## 2021-05-19 ENCOUNTER — THERAPY VISIT (OUTPATIENT)
Dept: PHYSICAL THERAPY | Facility: CLINIC | Age: 36
End: 2021-05-19
Payer: COMMERCIAL

## 2021-05-19 DIAGNOSIS — M25.571 PAIN IN JOINT, ANKLE AND FOOT, RIGHT: ICD-10-CM

## 2021-05-19 DIAGNOSIS — Z47.89 AFTERCARE FOLLOWING SURGERY OF THE MUSCULOSKELETAL SYSTEM: ICD-10-CM

## 2021-05-19 PROCEDURE — 97116 GAIT TRAINING THERAPY: CPT | Mod: GP | Performed by: PHYSICAL THERAPIST

## 2021-05-19 PROCEDURE — 97110 THERAPEUTIC EXERCISES: CPT | Mod: GP | Performed by: PHYSICAL THERAPIST

## 2021-05-21 ENCOUNTER — THERAPY VISIT (OUTPATIENT)
Dept: PHYSICAL THERAPY | Facility: CLINIC | Age: 36
End: 2021-05-21
Payer: COMMERCIAL

## 2021-05-21 DIAGNOSIS — Z47.89 AFTERCARE FOLLOWING SURGERY OF THE MUSCULOSKELETAL SYSTEM: ICD-10-CM

## 2021-05-21 DIAGNOSIS — M25.571 PAIN IN JOINT, ANKLE AND FOOT, RIGHT: ICD-10-CM

## 2021-05-21 PROCEDURE — 97110 THERAPEUTIC EXERCISES: CPT | Mod: GP | Performed by: PHYSICAL THERAPIST

## 2021-05-21 PROCEDURE — 97116 GAIT TRAINING THERAPY: CPT | Mod: GP | Performed by: PHYSICAL THERAPIST

## 2021-05-21 PROCEDURE — 97140 MANUAL THERAPY 1/> REGIONS: CPT | Mod: GP | Performed by: PHYSICAL THERAPIST

## 2021-06-02 ENCOUNTER — THERAPY VISIT (OUTPATIENT)
Dept: PHYSICAL THERAPY | Facility: CLINIC | Age: 36
End: 2021-06-02
Payer: COMMERCIAL

## 2021-06-02 DIAGNOSIS — M25.571 PAIN IN JOINT, ANKLE AND FOOT, RIGHT: ICD-10-CM

## 2021-06-02 DIAGNOSIS — Z47.89 AFTERCARE FOLLOWING SURGERY OF THE MUSCULOSKELETAL SYSTEM: ICD-10-CM

## 2021-06-02 PROCEDURE — 97530 THERAPEUTIC ACTIVITIES: CPT | Mod: GP

## 2021-06-02 PROCEDURE — 97110 THERAPEUTIC EXERCISES: CPT | Mod: GP

## 2021-06-02 PROCEDURE — 97140 MANUAL THERAPY 1/> REGIONS: CPT | Mod: GP

## 2021-06-04 ENCOUNTER — THERAPY VISIT (OUTPATIENT)
Dept: PHYSICAL THERAPY | Facility: CLINIC | Age: 36
End: 2021-06-04
Payer: COMMERCIAL

## 2021-06-04 DIAGNOSIS — M25.571 PAIN IN JOINT, ANKLE AND FOOT, RIGHT: ICD-10-CM

## 2021-06-04 DIAGNOSIS — Z47.89 AFTERCARE FOLLOWING SURGERY OF THE MUSCULOSKELETAL SYSTEM: ICD-10-CM

## 2021-06-04 PROCEDURE — 97110 THERAPEUTIC EXERCISES: CPT | Mod: GP

## 2021-06-04 PROCEDURE — 97140 MANUAL THERAPY 1/> REGIONS: CPT | Mod: GP

## 2021-06-08 ENCOUNTER — THERAPY VISIT (OUTPATIENT)
Dept: PHYSICAL THERAPY | Facility: CLINIC | Age: 36
End: 2021-06-08
Payer: COMMERCIAL

## 2021-06-08 DIAGNOSIS — M25.571 PAIN IN JOINT, ANKLE AND FOOT, RIGHT: ICD-10-CM

## 2021-06-08 DIAGNOSIS — Z47.89 AFTERCARE FOLLOWING SURGERY OF THE MUSCULOSKELETAL SYSTEM: ICD-10-CM

## 2021-06-08 DIAGNOSIS — R26.9 GAIT DIFFICULTY: ICD-10-CM

## 2021-06-08 PROCEDURE — 97110 THERAPEUTIC EXERCISES: CPT | Mod: GP | Performed by: PHYSICAL THERAPIST

## 2021-06-08 PROCEDURE — 97112 NEUROMUSCULAR REEDUCATION: CPT | Mod: GP | Performed by: PHYSICAL THERAPIST

## 2021-06-08 PROCEDURE — 97116 GAIT TRAINING THERAPY: CPT | Mod: GP | Performed by: PHYSICAL THERAPIST

## 2021-06-08 NOTE — PATIENT INSTRUCTIONS
Again, thank you for referring  Lani  to the [facility] for treatment of R Ankle.  I have attached a Current PT progress note for you to review prior to you visit with  Lani on  6/9/2021.  She is demonstrating progress; able to now ambulate with 1 crutch and Cam Boot for short distances.  Lani continues to have hesitancy but I think she is surprising herself some days with what she can do.  Please message me or call me at my direct # [phone] if you have any questions.    Thank you again-    Cheryl Hale, MPT  [facility]  [address]  [place] [address]  [phone] (office number)

## 2021-06-08 NOTE — PROGRESS NOTES
Subjective:  HPI  Physical Exam                    Objective:  System    Physical Exam    General     ROS    Assessment/Plan:    PROGRESS  REPORT    Progress reporting period is from 5/12/2021 to 6/8/2021.       SUBJECTIVE  Subjective changes noted by patient:  Patient reports that she is using the walker more at home to walk (is wearing her boot).  Patient is happy with her progress but still hesitant to walk without anything.    Current pain level is 3-4/10 but pain is not constant.     Previous pain level was  NA  .   Changes in function:  Yes (See Goal flowsheet attached for changes in current functional level)  Adverse reaction to treatment or activity: activity - increase soreness;very tired with activity.    OBJECTIVE  Changes noted in objective findings:      Gait: able to ambulate in and out of clinic with use of wheeled walker and Cam Boot (arms get tired).   Also able to ambulate with crutch and Cam Boot for 100';  Pt continues to have fear with walking but able to progress as pt tolerates with assistance from PT.    L ankle AROM: DF: 8; PF: 44; INV: 20; EV: 12. PROM: DF: 10; PF: 52     ASSESSMENT/PLAN  Updated problem list and treatment plan: Diagnosis 1:  S/p R ankle Fracture  Pain -  hot/cold therapy and manual therapy  Decreased ROM/flexibility - manual therapy, therapeutic exercise and therapeutic activity  Decreased joint mobility - manual therapy, therapeutic exercise and therapeutic activity  Decreased strength - therapeutic exercise, therapeutic activities and home program  Decreased proprioception - neuro re-education and therapeutic activities  Impaired gait - gait training and assistive devices  Impaired muscle performance - neuro re-education  Decreased function - therapeutic activities and home program  STG/LTGs have been met or progress has been made towards goals:  Yes (See Goal flow sheet completed today.)  Assessment of Progress: The patient's condition is improving.  The patient's  condition has potential to improve.  Self Management Plans:  Patient has been instructed in a home treatment program.  I have re-evaluated this patient and find that the nature, scope, duration and intensity of the therapy is appropriate for the medical condition of the patient.  Lani continues to require the following intervention to meet STG and LTG's:  PT    Recommendations:  This patient would benefit from continued therapy.     Frequency:  1-2 X week, once daily  Duration:  for 6 weeks        Please refer to the daily flowsheet for treatment today, total treatment time and time spent performing 1:1 timed codes.

## 2021-06-09 ENCOUNTER — ANCILLARY PROCEDURE (OUTPATIENT)
Dept: GENERAL RADIOLOGY | Facility: CLINIC | Age: 36
End: 2021-06-09
Attending: ORTHOPAEDIC SURGERY
Payer: COMMERCIAL

## 2021-06-09 ENCOUNTER — OFFICE VISIT (OUTPATIENT)
Dept: ORTHOPEDICS | Facility: CLINIC | Age: 36
End: 2021-06-09
Payer: COMMERCIAL

## 2021-06-09 VITALS
DIASTOLIC BLOOD PRESSURE: 79 MMHG | BODY MASS INDEX: 41.83 KG/M2 | OXYGEN SATURATION: 95 % | WEIGHT: 276 LBS | HEART RATE: 81 BPM | HEIGHT: 68 IN | SYSTOLIC BLOOD PRESSURE: 124 MMHG

## 2021-06-09 DIAGNOSIS — S93.431A SYNDESMOTIC DISRUPTION OF RIGHT ANKLE, INITIAL ENCOUNTER: ICD-10-CM

## 2021-06-09 DIAGNOSIS — Z09 POSTOPERATIVE FOLLOW-UP: ICD-10-CM

## 2021-06-09 DIAGNOSIS — S82.891D CLOSED FRACTURE OF RIGHT ANKLE WITH ROUTINE HEALING, SUBSEQUENT ENCOUNTER: ICD-10-CM

## 2021-06-09 PROCEDURE — 73610 X-RAY EXAM OF ANKLE: CPT | Mod: RT | Performed by: RADIOLOGY

## 2021-06-09 PROCEDURE — 99024 POSTOP FOLLOW-UP VISIT: CPT | Performed by: ORTHOPAEDIC SURGERY

## 2021-06-09 ASSESSMENT — MIFFLIN-ST. JEOR: SCORE: 2000.43

## 2021-06-09 ASSESSMENT — PAIN SCALES - GENERAL: PAINLEVEL: MODERATE PAIN (4)

## 2021-06-09 NOTE — LETTER
"    6/9/2021         RE: Lani Lewis  [address] [facility] [address]        Dear Colleague,    Thank you for referring your patient, Lani Lewis, to the [facility]. Please see a copy of my visit note below.    SUBJECTIVE:  Lani Lewis is a 35 year old female who is here today for follow up of her ORIF right ankle fracture on 3/12/21  Syndesmosis repair?: yes, with 2 tightropes    Last time:  Weight Bearing: continue with as tolerated weight bearing in fracture boot   Can. Again remove boot at rest or at night.  Driving ok out of boot   Scar management discussed.  Rehab: continue home exercise program.    Continue physical therapy. Strengthening, range of motion     Symptoms: gets swelling. Using walker and boot most of the time  Doing well in physical therapy, but is cautious.    OBJECTIVE:   /79 (BP Location: Left arm, Patient Position: Sitting, Cuff Size: Adult Regular)   Pulse 81   Ht 1.727 m (5' 8\")   Wt 125.2 kg (276 lb)   SpO2 95%   BMI 41.97 kg/m     Patient appears to be alert and in no apparent distress.  Skin: wounds healed.    Neurovascularly Intact.    ROM: is pretty good. Diffuse mild pain with full dorsiflexion   Ligaments stable  No pain with dorsiflexion + ER  Tenderness:  Minimal Non-tender over medial malleolus    X-rays today:   Mortice looks good, again, the fibula is a bit short. The fibular shaft fracture is offset a bit, and no definite callous is seen..  The medial malleolus fracture is in good position, and essentially healed.  There seems to be significant osteopenia.    ASSESSMENT:     ICD-10-CM    1. Closed fracture of right ankle with routine healing, subsequent encounter  S82.891D    2. Syndesmotic disruption of right ankle, initial encounter  S93.431A    3. Postoperative follow-up  Z09         PLAN:   Weight Bearing: as tolerated  Boot: can wean the boot.  Suggest considering weaning boot when able to walk without walker " using boot.  She indicates that the boot is uncomfortable, so she may go to a regular shoe.  Rehab: continue home exercise program.    Continue physical therapy. Strengthening, range of motion, proprio   Medications:  None    Primary care provider: consider DEXA scan.    Return to clinic 4 weeks. No xrays needed..      THONG Medeiros MD  Dept. Orthopedic Surgery  [facility]      Again, thank you for allowing me to participate in the care of your patient.        Sincerely,        Eriberto Medeiros MD

## 2021-06-09 NOTE — PROGRESS NOTES
"SUBJECTIVE:  Lani Lewis is a 35 year old female who is here today for follow up of her ORIF right ankle fracture on 3/12/21  Syndesmosis repair?: yes, with 2 tightropes    Last time:  Weight Bearing: continue with as tolerated weight bearing in fracture boot   Can. Again remove boot at rest or at night.  Driving ok out of boot   Scar management discussed.  Rehab: continue home exercise program.    Continue physical therapy. Strengthening, range of motion     Symptoms: gets swelling. Using walker and boot most of the time  Doing well in physical therapy, but is cautious.    OBJECTIVE:   /79 (BP Location: Left arm, Patient Position: Sitting, Cuff Size: Adult Regular)   Pulse 81   Ht 1.727 m (5' 8\")   Wt 125.2 kg (276 lb)   SpO2 95%   BMI 41.97 kg/m     Patient appears to be alert and in no apparent distress.  Skin: wounds healed.    Neurovascularly Intact.    ROM: is pretty good. Diffuse mild pain with full dorsiflexion   Ligaments stable  No pain with dorsiflexion + ER  Tenderness:  Minimal Non-tender over medial malleolus    X-rays today:   Mortice looks good, again, the fibula is a bit short. The fibular shaft fracture is offset a bit, and no definite callous is seen..  The medial malleolus fracture is in good position, and essentially healed.  There seems to be significant osteopenia.    ASSESSMENT:     ICD-10-CM    1. Closed fracture of right ankle with routine healing, subsequent encounter  S82.891D    2. Syndesmotic disruption of right ankle, initial encounter  S93.431A    3. Postoperative follow-up  Z09         PLAN:   Weight Bearing: as tolerated  Boot: can wean the boot.  Suggest considering weaning boot when able to walk without walker using boot.  She indicates that the boot is uncomfortable, so she may go to a regular shoe.  Rehab: continue home exercise program.    Continue physical therapy. Strengthening, range of motion, proprio   Medications:  None    Primary care provider: consider " DEXA scan.    Return to clinic 4 weeks. No xrays needed..      THONG Medeiros MD  Dept. Orthopedic Surgery  [facility]

## 2021-06-10 ENCOUNTER — THERAPY VISIT (OUTPATIENT)
Dept: PHYSICAL THERAPY | Facility: CLINIC | Age: 36
End: 2021-06-10
Payer: COMMERCIAL

## 2021-06-10 DIAGNOSIS — M25.571 PAIN IN JOINT, ANKLE AND FOOT, RIGHT: ICD-10-CM

## 2021-06-10 DIAGNOSIS — Z47.89 AFTERCARE FOLLOWING SURGERY OF THE MUSCULOSKELETAL SYSTEM: ICD-10-CM

## 2021-06-10 DIAGNOSIS — R26.9 GAIT DIFFICULTY: ICD-10-CM

## 2021-06-10 PROCEDURE — 97110 THERAPEUTIC EXERCISES: CPT | Mod: GP | Performed by: PHYSICAL THERAPIST

## 2021-06-10 PROCEDURE — 97116 GAIT TRAINING THERAPY: CPT | Mod: GP | Performed by: PHYSICAL THERAPIST

## 2021-06-15 ENCOUNTER — THERAPY VISIT (OUTPATIENT)
Dept: PHYSICAL THERAPY | Facility: CLINIC | Age: 36
End: 2021-06-15
Payer: COMMERCIAL

## 2021-06-15 DIAGNOSIS — M25.571 PAIN IN JOINT, ANKLE AND FOOT, RIGHT: ICD-10-CM

## 2021-06-15 DIAGNOSIS — Z47.89 AFTERCARE FOLLOWING SURGERY OF THE MUSCULOSKELETAL SYSTEM: ICD-10-CM

## 2021-06-15 DIAGNOSIS — R26.9 GAIT DIFFICULTY: ICD-10-CM

## 2021-06-15 PROCEDURE — 97110 THERAPEUTIC EXERCISES: CPT | Mod: GP | Performed by: PHYSICAL THERAPIST

## 2021-06-15 PROCEDURE — 97116 GAIT TRAINING THERAPY: CPT | Mod: GP | Performed by: PHYSICAL THERAPIST

## 2021-06-17 ENCOUNTER — THERAPY VISIT (OUTPATIENT)
Dept: PHYSICAL THERAPY | Facility: CLINIC | Age: 36
End: 2021-06-17
Payer: COMMERCIAL

## 2021-06-17 DIAGNOSIS — M25.571 PAIN IN JOINT, ANKLE AND FOOT, RIGHT: ICD-10-CM

## 2021-06-17 DIAGNOSIS — Z47.89 AFTERCARE FOLLOWING SURGERY OF THE MUSCULOSKELETAL SYSTEM: ICD-10-CM

## 2021-06-17 DIAGNOSIS — R26.9 GAIT DIFFICULTY: ICD-10-CM

## 2021-06-17 PROCEDURE — 97110 THERAPEUTIC EXERCISES: CPT | Mod: GP | Performed by: PHYSICAL THERAPIST

## 2021-06-17 PROCEDURE — 97140 MANUAL THERAPY 1/> REGIONS: CPT | Mod: GP | Performed by: PHYSICAL THERAPIST

## 2021-06-22 ENCOUNTER — THERAPY VISIT (OUTPATIENT)
Dept: PHYSICAL THERAPY | Facility: CLINIC | Age: 36
End: 2021-06-22
Payer: COMMERCIAL

## 2021-06-22 DIAGNOSIS — R26.9 GAIT DIFFICULTY: ICD-10-CM

## 2021-06-22 DIAGNOSIS — Z47.89 AFTERCARE FOLLOWING SURGERY OF THE MUSCULOSKELETAL SYSTEM: ICD-10-CM

## 2021-06-22 DIAGNOSIS — M25.571 PAIN IN JOINT, ANKLE AND FOOT, RIGHT: ICD-10-CM

## 2021-06-22 PROCEDURE — 97110 THERAPEUTIC EXERCISES: CPT | Mod: GP | Performed by: PHYSICAL THERAPIST

## 2021-06-22 PROCEDURE — 97116 GAIT TRAINING THERAPY: CPT | Mod: GP | Performed by: PHYSICAL THERAPIST

## 2021-06-22 PROCEDURE — 97140 MANUAL THERAPY 1/> REGIONS: CPT | Mod: GP | Performed by: PHYSICAL THERAPIST

## 2021-06-24 ENCOUNTER — THERAPY VISIT (OUTPATIENT)
Dept: PHYSICAL THERAPY | Facility: CLINIC | Age: 36
End: 2021-06-24
Payer: COMMERCIAL

## 2021-06-24 DIAGNOSIS — R26.9 GAIT DIFFICULTY: ICD-10-CM

## 2021-06-24 DIAGNOSIS — M25.571 PAIN IN JOINT, ANKLE AND FOOT, RIGHT: ICD-10-CM

## 2021-06-24 DIAGNOSIS — Z47.89 AFTERCARE FOLLOWING SURGERY OF THE MUSCULOSKELETAL SYSTEM: ICD-10-CM

## 2021-06-24 PROCEDURE — 97110 THERAPEUTIC EXERCISES: CPT | Mod: GP | Performed by: PHYSICAL THERAPIST

## 2021-06-24 PROCEDURE — 97140 MANUAL THERAPY 1/> REGIONS: CPT | Mod: GP | Performed by: PHYSICAL THERAPIST

## 2021-07-06 ENCOUNTER — APPOINTMENT (OUTPATIENT)
Dept: INTERPRETER SERVICES | Facility: CLINIC | Age: 36
End: 2021-07-06
Payer: COMMERCIAL

## 2021-07-06 ENCOUNTER — THERAPY VISIT (OUTPATIENT)
Dept: PHYSICAL THERAPY | Facility: CLINIC | Age: 36
End: 2021-07-06
Payer: COMMERCIAL

## 2021-07-06 DIAGNOSIS — R26.9 GAIT DIFFICULTY: ICD-10-CM

## 2021-07-06 DIAGNOSIS — M25.571 PAIN IN JOINT, ANKLE AND FOOT, RIGHT: ICD-10-CM

## 2021-07-06 DIAGNOSIS — Z47.89 AFTERCARE FOLLOWING SURGERY OF THE MUSCULOSKELETAL SYSTEM: ICD-10-CM

## 2021-07-06 PROCEDURE — 97110 THERAPEUTIC EXERCISES: CPT | Mod: GP | Performed by: PHYSICAL THERAPIST

## 2021-07-08 ENCOUNTER — THERAPY VISIT (OUTPATIENT)
Dept: PHYSICAL THERAPY | Facility: CLINIC | Age: 36
End: 2021-07-08
Payer: COMMERCIAL

## 2021-07-08 DIAGNOSIS — Z47.89 AFTERCARE FOLLOWING SURGERY OF THE MUSCULOSKELETAL SYSTEM: ICD-10-CM

## 2021-07-08 DIAGNOSIS — R26.9 GAIT DIFFICULTY: ICD-10-CM

## 2021-07-08 DIAGNOSIS — M25.571 PAIN IN JOINT, ANKLE AND FOOT, RIGHT: ICD-10-CM

## 2021-07-08 PROCEDURE — 97112 NEUROMUSCULAR REEDUCATION: CPT | Mod: GP | Performed by: PHYSICAL THERAPIST

## 2021-07-08 PROCEDURE — 97110 THERAPEUTIC EXERCISES: CPT | Mod: GP | Performed by: PHYSICAL THERAPIST

## 2021-07-08 PROCEDURE — 97140 MANUAL THERAPY 1/> REGIONS: CPT | Mod: GP | Performed by: PHYSICAL THERAPIST

## 2021-07-13 ENCOUNTER — THERAPY VISIT (OUTPATIENT)
Dept: PHYSICAL THERAPY | Facility: CLINIC | Age: 36
End: 2021-07-13
Payer: COMMERCIAL

## 2021-07-13 DIAGNOSIS — M25.571 PAIN IN JOINT, ANKLE AND FOOT, RIGHT: ICD-10-CM

## 2021-07-13 DIAGNOSIS — Z47.89 AFTERCARE FOLLOWING SURGERY OF THE MUSCULOSKELETAL SYSTEM: ICD-10-CM

## 2021-07-13 DIAGNOSIS — R26.9 GAIT DIFFICULTY: ICD-10-CM

## 2021-07-13 PROCEDURE — 97140 MANUAL THERAPY 1/> REGIONS: CPT | Mod: GP | Performed by: PHYSICAL THERAPIST

## 2021-07-13 PROCEDURE — 97110 THERAPEUTIC EXERCISES: CPT | Mod: GP | Performed by: PHYSICAL THERAPIST

## 2021-07-13 PROCEDURE — 97112 NEUROMUSCULAR REEDUCATION: CPT | Mod: GP | Performed by: PHYSICAL THERAPIST

## 2021-07-15 ENCOUNTER — THERAPY VISIT (OUTPATIENT)
Dept: PHYSICAL THERAPY | Facility: CLINIC | Age: 36
End: 2021-07-15
Payer: COMMERCIAL

## 2021-07-15 DIAGNOSIS — M25.571 PAIN IN JOINT, ANKLE AND FOOT, RIGHT: ICD-10-CM

## 2021-07-15 DIAGNOSIS — Z47.89 AFTERCARE FOLLOWING SURGERY OF THE MUSCULOSKELETAL SYSTEM: ICD-10-CM

## 2021-07-15 DIAGNOSIS — R26.9 GAIT DIFFICULTY: ICD-10-CM

## 2021-07-15 PROCEDURE — 97116 GAIT TRAINING THERAPY: CPT | Mod: GP | Performed by: PHYSICAL THERAPIST

## 2021-07-15 PROCEDURE — 97110 THERAPEUTIC EXERCISES: CPT | Mod: GP | Performed by: PHYSICAL THERAPIST

## 2021-07-15 PROCEDURE — 97140 MANUAL THERAPY 1/> REGIONS: CPT | Mod: GP | Performed by: PHYSICAL THERAPIST

## 2021-07-19 ENCOUNTER — THERAPY VISIT (OUTPATIENT)
Dept: PHYSICAL THERAPY | Facility: CLINIC | Age: 36
End: 2021-07-19
Payer: COMMERCIAL

## 2021-07-19 DIAGNOSIS — M25.571 PAIN IN JOINT, ANKLE AND FOOT, RIGHT: ICD-10-CM

## 2021-07-19 DIAGNOSIS — R26.9 GAIT DIFFICULTY: ICD-10-CM

## 2021-07-19 DIAGNOSIS — Z47.89 AFTERCARE FOLLOWING SURGERY OF THE MUSCULOSKELETAL SYSTEM: ICD-10-CM

## 2021-07-19 PROCEDURE — 97110 THERAPEUTIC EXERCISES: CPT | Mod: GP | Performed by: PHYSICAL THERAPIST

## 2021-07-19 PROCEDURE — 97112 NEUROMUSCULAR REEDUCATION: CPT | Mod: GP | Performed by: PHYSICAL THERAPIST

## 2021-07-19 PROCEDURE — 97140 MANUAL THERAPY 1/> REGIONS: CPT | Mod: GP | Performed by: PHYSICAL THERAPIST

## 2021-07-27 ENCOUNTER — THERAPY VISIT (OUTPATIENT)
Dept: PHYSICAL THERAPY | Facility: CLINIC | Age: 36
End: 2021-07-27
Payer: COMMERCIAL

## 2021-07-27 DIAGNOSIS — M25.571 PAIN IN JOINT, ANKLE AND FOOT, RIGHT: ICD-10-CM

## 2021-07-27 DIAGNOSIS — Z47.89 AFTERCARE FOLLOWING SURGERY OF THE MUSCULOSKELETAL SYSTEM: ICD-10-CM

## 2021-07-27 DIAGNOSIS — R26.9 GAIT DIFFICULTY: ICD-10-CM

## 2021-07-27 PROCEDURE — 97110 THERAPEUTIC EXERCISES: CPT | Mod: GP | Performed by: PHYSICAL THERAPIST

## 2021-07-27 PROCEDURE — 97112 NEUROMUSCULAR REEDUCATION: CPT | Mod: GP | Performed by: PHYSICAL THERAPIST

## 2021-07-27 PROCEDURE — 97140 MANUAL THERAPY 1/> REGIONS: CPT | Mod: GP | Performed by: PHYSICAL THERAPIST

## 2021-07-27 NOTE — PROGRESS NOTES
"  HISTORY OF PRESENT ILLNESS    Lani Lewis is a 36 year old female evaluated in follow up of  Lani Lewis is a 35 year old female who is here today for follow up of her ORIF right ankle fracture on 3/12/21  Syndesmosis repair?: yes, with 2 tightropes. Proximal fibular fracture.    Last time:  Weight Bearing: as tolerated  Boot: can wean the boot.  Suggest considering weaning boot when able to walk without walker using boot.  She indicates that the boot is uncomfortable, so she may go to a regular shoe.  Rehab: continue home exercise program.    Continue physical therapy. Strengthening, range of motion, proprio   Medications:  None    Present symptoms: no pain in the ankle or the leg  Complains of low back pain  physical therapy thinks could be due to boot wear.  Discontinued the boot yesterday.    I have reviewed and updated the patient's Past Medical History, Social History, Family History and Medication List.    No Known Allergies     REVIEW OF SYSTEMS:    CONSTITUTIONAL:NEGATIVE for fever, chills, change in weight  INTEGUMENTARY/SKIN: NEGATIVE for worrisome rashes, moles or lesions  PSYCH: negative  NEURO: NEGATIVE for weakness, dizziness or paresthesias    OBJECTIVE:  Physical Exam:  /71 (BP Location: Left arm, Patient Position: Sitting, Cuff Size: Adult Regular)   Pulse 76   Ht 1.727 m (5' 8\")   Wt 125.2 kg (276 lb)   SpO2 96%   BMI 41.97 kg/m    General impression of health: Sounds healthy, seems alert and in no distress   Psych:  mentation seems normal and affect normal/bright  Resp: no increased work of breathing detected on the call.    ROM of the ankle full  Non-tender over ankle and fibula      Impression:  Encounter Diagnoses   Name Primary?     Closed fracture of right ankle with routine healing, subsequent encounter Yes     Syndesmotic disruption of right ankle, subsequent encounter      Postoperative follow-up      Low back pain without sciatica, unspecified back pain laterality, " unspecified chronicity      Morbid obesity (H)        Plan:  Discontinue boot  Therapy: continue   Work: works as PCA.  No note needed    Follow-up: as needed      THONG Medeiros MD  Dept. Orthopedic Surgery  [facility]

## 2021-07-28 ENCOUNTER — OFFICE VISIT (OUTPATIENT)
Dept: ORTHOPEDICS | Facility: CLINIC | Age: 36
End: 2021-07-28
Payer: COMMERCIAL

## 2021-07-28 VITALS
WEIGHT: 276 LBS | OXYGEN SATURATION: 96 % | SYSTOLIC BLOOD PRESSURE: 106 MMHG | BODY MASS INDEX: 41.83 KG/M2 | HEIGHT: 68 IN | DIASTOLIC BLOOD PRESSURE: 71 MMHG | HEART RATE: 76 BPM

## 2021-07-28 DIAGNOSIS — Z09 POSTOPERATIVE FOLLOW-UP: ICD-10-CM

## 2021-07-28 DIAGNOSIS — S82.891D CLOSED FRACTURE OF RIGHT ANKLE WITH ROUTINE HEALING, SUBSEQUENT ENCOUNTER: Primary | ICD-10-CM

## 2021-07-28 DIAGNOSIS — M54.50 LOW BACK PAIN WITHOUT SCIATICA, UNSPECIFIED BACK PAIN LATERALITY, UNSPECIFIED CHRONICITY: ICD-10-CM

## 2021-07-28 DIAGNOSIS — S93.431D SYNDESMOTIC DISRUPTION OF RIGHT ANKLE, SUBSEQUENT ENCOUNTER: ICD-10-CM

## 2021-07-28 DIAGNOSIS — E66.01 MORBID OBESITY (H): ICD-10-CM

## 2021-07-28 PROCEDURE — 99212 OFFICE O/P EST SF 10 MIN: CPT | Performed by: ORTHOPAEDIC SURGERY

## 2021-07-28 ASSESSMENT — PAIN SCALES - GENERAL: PAINLEVEL: MILD PAIN (3)

## 2021-07-28 ASSESSMENT — MIFFLIN-ST. JEOR: SCORE: 2000.43

## 2021-07-28 NOTE — LETTER
"    7/28/2021         RE: Lani Lewis  [address] [facility] [address]        Dear Colleague,    Thank you for referring your patient, Lani Leiws, to the [facility]. Please see a copy of my visit note below.      HISTORY OF PRESENT ILLNESS    Lani Lewis is a 36 year old female evaluated in follow up of  Lani Lewis is a 35 year old female who is here today for follow up of her ORIF right ankle fracture on 3/12/21  Syndesmosis repair?: yes, with 2 tightropes. Proximal fibular fracture.    Last time:  Weight Bearing: as tolerated  Boot: can wean the boot.  Suggest considering weaning boot when able to walk without walker using boot.  She indicates that the boot is uncomfortable, so she may go to a regular shoe.  Rehab: continue home exercise program.    Continue physical therapy. Strengthening, range of motion, proprio   Medications:  None    Present symptoms: no pain in the ankle or the leg  Complains of low back pain  physical therapy thinks could be due to boot wear.  Discontinued the boot yesterday.    I have reviewed and updated the patient's Past Medical History, Social History, Family History and Medication List.    No Known Allergies     REVIEW OF SYSTEMS:    CONSTITUTIONAL:NEGATIVE for fever, chills, change in weight  INTEGUMENTARY/SKIN: NEGATIVE for worrisome rashes, moles or lesions  PSYCH: negative  NEURO: NEGATIVE for weakness, dizziness or paresthesias    OBJECTIVE:  Physical Exam:  /71 (BP Location: Left arm, Patient Position: Sitting, Cuff Size: Adult Regular)   Pulse 76   Ht 1.727 m (5' 8\")   Wt 125.2 kg (276 lb)   SpO2 96%   BMI 41.97 kg/m    General impression of health: Sounds healthy, seems alert and in no distress   Psych:  mentation seems normal and affect normal/bright  Resp: no increased work of breathing detected on the call.    ROM of the ankle full  Non-tender over ankle and fibula      Impression:  Encounter Diagnoses "   Name Primary?     Closed fracture of right ankle with routine healing, subsequent encounter Yes     Syndesmotic disruption of right ankle, subsequent encounter      Postoperative follow-up      Low back pain without sciatica, unspecified back pain laterality, unspecified chronicity      Morbid obesity (H)        Plan:  Discontinue boot  Therapy: continue   Work: works as PCA.  No note needed    Follow-up: as needed      THONG Medeiros MD  Dept. Orthopedic Surgery  [facility]       Again, thank you for allowing me to participate in the care of your patient.        Sincerely,        Eriberto Medeiros MD

## 2021-07-29 ENCOUNTER — THERAPY VISIT (OUTPATIENT)
Dept: PHYSICAL THERAPY | Facility: CLINIC | Age: 36
End: 2021-07-29
Payer: COMMERCIAL

## 2021-07-29 DIAGNOSIS — Z47.89 AFTERCARE FOLLOWING SURGERY OF THE MUSCULOSKELETAL SYSTEM: ICD-10-CM

## 2021-07-29 DIAGNOSIS — M25.571 PAIN IN JOINT, ANKLE AND FOOT, RIGHT: ICD-10-CM

## 2021-07-29 DIAGNOSIS — R26.9 GAIT DIFFICULTY: ICD-10-CM

## 2021-07-29 PROCEDURE — 97112 NEUROMUSCULAR REEDUCATION: CPT | Mod: GP | Performed by: PHYSICAL THERAPIST

## 2021-07-29 PROCEDURE — 97110 THERAPEUTIC EXERCISES: CPT | Mod: GP | Performed by: PHYSICAL THERAPIST

## 2021-08-09 ENCOUNTER — TELEPHONE (OUTPATIENT)
Dept: PHYSICAL THERAPY | Facility: CLINIC | Age: 36
End: 2021-08-09

## 2021-08-09 NOTE — TELEPHONE ENCOUNTER
[facility] Call Center    Phone Message    May a detailed message be left on voicemail: yes     Reason for Call: Other: Patient asked for a call back, she was unsure of needing future appts? Please call her back at [phone]     Action Taken: Message routed to:  [facility] Ortho    Travel Screening: Not Applicable

## 2021-08-19 ENCOUNTER — THERAPY VISIT (OUTPATIENT)
Dept: PHYSICAL THERAPY | Facility: CLINIC | Age: 36
End: 2021-08-19
Payer: COMMERCIAL

## 2021-08-19 DIAGNOSIS — M54.50 CHRONIC MIDLINE LOW BACK PAIN WITHOUT SCIATICA: ICD-10-CM

## 2021-08-19 DIAGNOSIS — S82.891D CLOSED FRACTURE OF RIGHT ANKLE WITH ROUTINE HEALING, SUBSEQUENT ENCOUNTER: ICD-10-CM

## 2021-08-19 DIAGNOSIS — G89.29 CHRONIC MIDLINE LOW BACK PAIN WITHOUT SCIATICA: ICD-10-CM

## 2021-08-19 DIAGNOSIS — M54.50 LOW BACK PAIN WITHOUT SCIATICA, UNSPECIFIED BACK PAIN LATERALITY, UNSPECIFIED CHRONICITY: ICD-10-CM

## 2021-08-19 DIAGNOSIS — R26.9 GAIT DIFFICULTY: ICD-10-CM

## 2021-08-19 DIAGNOSIS — Z47.89 AFTERCARE FOLLOWING SURGERY OF THE MUSCULOSKELETAL SYSTEM: ICD-10-CM

## 2021-08-19 DIAGNOSIS — M25.571 PAIN IN JOINT, ANKLE AND FOOT, RIGHT: ICD-10-CM

## 2021-08-19 PROCEDURE — 97110 THERAPEUTIC EXERCISES: CPT | Mod: GP | Performed by: PHYSICAL THERAPIST

## 2021-08-19 PROCEDURE — 97140 MANUAL THERAPY 1/> REGIONS: CPT | Mod: GP | Performed by: PHYSICAL THERAPIST

## 2021-08-19 PROCEDURE — 97161 PT EVAL LOW COMPLEX 20 MIN: CPT | Mod: GP | Performed by: PHYSICAL THERAPIST

## 2021-08-19 NOTE — PROGRESS NOTES
Subjective:  The history is provided by the patient. The history is limited by a language barrier. A  was used.   Patient Health History  Lani Lewis being seen for LBP.     Problem began: 7/28/2021.   Problem occurred: unknown- started after she fell and broke her foot   Pain is reported as 8/10 on pain scale.  General health as reported by patient is good.  Pertinent medical history includes: none.   Red flags:  None as reported by patient.  Medical allergies: none.   Surgeries include:  Orthopedic surgery. Other surgery history details: R ankle.    Current medications:  Pain medication.    Current occupation is Housewife; part-time PCA.                     Therapist Generated HPI Evaluation  Problem details: Patient presents to PT today with c/o LBP (R>L side). Patient stated her pain started after she fell and broke her foot..         Type of problem:  Lumbar.    This is a chronic condition.  Condition occurred with:  Insidious onset.  Where condition occurred: for unknown reasons.  Patient reports pain:  Lumbar spine right, central lumbar spine and lumbar spine left (R> L side).  Pain is described as aching and other (pulling pain) and is intermittent.  Pain radiates to:  No radiation.   Since onset symptoms are unchanged.  Associated symptoms:  Loss of motion/stiffness. Symptoms are exacerbated by walking and standing  and relieved by rest.  Imaging testing: none.    Barriers include:  None as reported by patient.      Physical Exam                    Objective:  System         Lumbar/SI Evaluation  ROM:    AROM Lumbar:   Flexion:          Touches toes  Ext:                    Mod loss   Side Bend:        Left:  Mid thigh; pulling R side    Right:  Mid thigh; pulling L side  Rotation:           Left:     Right:   Side Glide:        Left:     Right:           Lumbar Myotomes:    T12-L3 (Hip Flex):  Left: 4    Right: 4  L2-4 (Quads):  Left:  5    Right:  5  L4 (Ankle DF):  Left:  5     Right:  5              Lumbar Palpation:    Tenderness present at Left:    Quadratus Lumborum; Erector Spinae and Piriformis  Tenderness present at Right: Quadratus Lumborum; Erector Spinae and Piriformis      Spinal Segmental Conclusions:     Level: Hypo noted at L1, L2, L3, L4 and L5                                                   General     ROS    Assessment/Plan:    Patient is a 36 year old female with lumbar complaints.    Patient has the following significant findings with corresponding treatment plan.                Diagnosis 1:  LBP  Pain -  hot/cold therapy, US and manual therapy  Decreased strength - therapeutic exercise and therapeutic activities  Impaired muscle performance - neuro re-education  Decreased function - therapeutic activities    Therapy Evaluation Codes:   1) History comprised of:   Personal factors that impact the plan of care:      Language and Time since onset of symptoms.    Comorbidity factors that impact the plan of care are:      None.     Medications impacting care: Pain.  2) Examination of Body Systems comprised of:   Body structures and functions that impact the plan of care:      Lumbar spine.   Activity limitations that impact the plan of care are:      Standing and Walking.  3) Clinical presentation characteristics are:   Stable/Uncomplicated.  4) Decision-Making    Low complexity using standardized patient assessment instrument and/or measureable assessment of functional outcome.  Cumulative Therapy Evaluation is: Low complexity.    Previous and current functional limitations:  (See Goal Flow Sheet for this information)    Short term and Long term goals: (See Goal Flow Sheet for this information)     Communication ability:  Patient has an  for communication clarity.  Treatment Explanation - The following has been discussed with the patient:   RX ordered/plan of care  Anticipated outcomes  Possible risks and side effects  This patient would benefit from PT  intervention to resume normal activities.   Rehab potential is good.    Frequency:  1 X week, once daily  Duration:  for 8 weeks  Discharge Plan:  Achieve all LTG.  Independent in home treatment program.  Reach maximal therapeutic benefit.    Please refer to the daily flowsheet for treatment today, total treatment time and time spent performing 1:1 timed codes.

## 2021-08-26 ENCOUNTER — THERAPY VISIT (OUTPATIENT)
Dept: PHYSICAL THERAPY | Facility: CLINIC | Age: 36
End: 2021-08-26
Payer: COMMERCIAL

## 2021-08-26 DIAGNOSIS — M25.571 PAIN IN JOINT, ANKLE AND FOOT, RIGHT: ICD-10-CM

## 2021-08-26 DIAGNOSIS — M54.50 CHRONIC MIDLINE LOW BACK PAIN WITHOUT SCIATICA: ICD-10-CM

## 2021-08-26 DIAGNOSIS — R26.9 GAIT DIFFICULTY: ICD-10-CM

## 2021-08-26 DIAGNOSIS — Z47.89 AFTERCARE FOLLOWING SURGERY OF THE MUSCULOSKELETAL SYSTEM: ICD-10-CM

## 2021-08-26 DIAGNOSIS — G89.29 CHRONIC MIDLINE LOW BACK PAIN WITHOUT SCIATICA: ICD-10-CM

## 2021-08-26 PROCEDURE — 97140 MANUAL THERAPY 1/> REGIONS: CPT | Mod: GP | Performed by: PHYSICAL THERAPIST

## 2021-08-26 PROCEDURE — 97110 THERAPEUTIC EXERCISES: CPT | Mod: GP | Performed by: PHYSICAL THERAPIST

## 2021-09-23 ENCOUNTER — THERAPY VISIT (OUTPATIENT)
Dept: PHYSICAL THERAPY | Facility: CLINIC | Age: 36
End: 2021-09-23
Payer: COMMERCIAL

## 2021-09-23 DIAGNOSIS — Z47.89 AFTERCARE FOLLOWING SURGERY OF THE MUSCULOSKELETAL SYSTEM: ICD-10-CM

## 2021-09-23 DIAGNOSIS — M54.50 CHRONIC MIDLINE LOW BACK PAIN WITHOUT SCIATICA: ICD-10-CM

## 2021-09-23 DIAGNOSIS — R26.9 GAIT DIFFICULTY: ICD-10-CM

## 2021-09-23 DIAGNOSIS — M25.571 PAIN IN JOINT, ANKLE AND FOOT, RIGHT: ICD-10-CM

## 2021-09-23 DIAGNOSIS — G89.29 CHRONIC MIDLINE LOW BACK PAIN WITHOUT SCIATICA: ICD-10-CM

## 2021-09-23 PROCEDURE — 97110 THERAPEUTIC EXERCISES: CPT | Mod: GP | Performed by: PHYSICAL THERAPIST

## 2021-09-23 PROCEDURE — 97140 MANUAL THERAPY 1/> REGIONS: CPT | Mod: GP | Performed by: PHYSICAL THERAPIST

## 2021-09-23 NOTE — PROGRESS NOTES
Subjective:  HPI  Physical Exam                    Objective:  System    Physical Exam    General     ROS    Assessment/Plan:    PROGRESS  REPORT    Progress reporting period is from 6/8/2021 to 9/23/2021.       SUBJECTIVE  Subjective changes noted by patient:   Patient reports her back pain is getting better; pain only if up on her feet walking or standing too long.  Patient stated 1 week ago she hit her R big toe on a piece of furniture (Jammed her toe); difficulty walking or standing for a few days due to toe pain.  Patient stated the R big toe was swollen.  Pt is now able to walk about 5 min without pain in her back or toe.    Current pain level is : 3/10 (R big toe; no pain in the back right now).     Previous pain level was  : 8/10 (previous back pain).   Changes in function:  None  Adverse reaction to treatment or activity: activity - increase soreness    OBJECTIVE  Changes noted in objective findings:    Lumbar Spine ROM: flex= touches toes, ext= min loss.   LE strength is good Bilaterally  Gait: antalgic; lacks full push off on R side due to toe injury.  R ankle AROM: 10 DF; 38 PF. R Great toe flex/ext: 10/52;  L great toee flex/ ext: 35/ 69     ASSESSMENT/PLAN  Updated problem list and treatment plan: Diagnosis 1:  S/p R Ankle Fracture  Pain -  hot/cold therapy and manual therapy  Decreased ROM/flexibility - manual therapy and therapeutic exercise  Impaired gait - gait training  Impaired muscle performance - neuro re-education  Decreased function - therapeutic activities  Diagnosis 2:  LBP   Impaired muscle performance - neuro re-education  Decreased function - therapeutic activities  STG/LTGs have been met or progress has been made towards goals:  Yes (See Goal flow sheet completed today.)  Assessment of Progress: The patient has had set backs in their progress regarding the R ankle/foot; Progressing well with LBP  Self Management Plans:  Patient has been instructed in a home treatment program.  I have  re-evaluated this patient and find that the nature, scope, duration and intensity of the therapy is appropriate for the medical condition of the patient.  Lani continues to require the following intervention to meet STG and LTG's:  PT    Recommendations:  This patient would benefit from continued therapy.     Frequency:  2 X a month, once daily  Duration:  for 1 months        Please refer to the daily flowsheet for treatment today, total treatment time and time spent performing 1:1 timed codes.

## 2021-10-21 ENCOUNTER — THERAPY VISIT (OUTPATIENT)
Dept: PHYSICAL THERAPY | Facility: CLINIC | Age: 36
End: 2021-10-21
Payer: COMMERCIAL

## 2021-10-21 DIAGNOSIS — G89.29 CHRONIC MIDLINE LOW BACK PAIN WITHOUT SCIATICA: ICD-10-CM

## 2021-10-21 DIAGNOSIS — M25.571 PAIN IN JOINT, ANKLE AND FOOT, RIGHT: ICD-10-CM

## 2021-10-21 DIAGNOSIS — R26.9 GAIT DIFFICULTY: ICD-10-CM

## 2021-10-21 DIAGNOSIS — Z47.89 AFTERCARE FOLLOWING SURGERY OF THE MUSCULOSKELETAL SYSTEM: ICD-10-CM

## 2021-10-21 DIAGNOSIS — M54.50 CHRONIC MIDLINE LOW BACK PAIN WITHOUT SCIATICA: ICD-10-CM

## 2021-10-21 PROCEDURE — 97110 THERAPEUTIC EXERCISES: CPT | Mod: GP | Performed by: PHYSICAL THERAPIST

## 2021-10-21 NOTE — LETTER
[facility]  [address]  [facility] [address]  [phone]    2021    Re: Lani Lewis   :   1985  MRN:  [id number]   REFERRING PHYSICIAN:   MD CHUCK [facility]  Date of Initial Evaluation: 2021   Visits:  Rxs Used: 26  Reason for Referral:     Chronic midline low back pain without sciatica  Gait difficulty  Pain in joint, ankle and foot, right  Aftercare following surgery of the musculoskeletal system    EVALUATION SUMMARY    PROGRESS  REPORT  Progress reporting period is from 2021 to 10/21 2021.       SUBJECTIVE  Subjective changes noted by patient:   Patient has mild back pain today; stated the pain comes and goes; usually has pain with walking more than 10 min.  Patient stated the R ankle is better; pain in the toes at time.      Current pain level is : 4/10.     Previous pain level was  : 8/10 (previous back pain).   Changes in function:  Yes (See Goal flowsheet attached for changes in current functional level)  Adverse reaction to treatment or activity: activity - prolong activity increases back pain.    OBJECTIVE  Changes noted in objective findings:      TROM: flexion= touches toes, ext= min loss, SB= WNL.    LE strength is good.    Standing posture:  increase lumbar lordosis.     R ankle AROM:  DF=10, SD 42.  R ankle strength is good except PF (unable to perform a single TR on R side).     ASSESSMENT/PLAN  Updated problem list and treatment plan: Diagnosis 1:  R ankle fx  Pain -  home program  Decreased strength - therapeutic exercise and therapeutic activities  Diagnosis 2:  LBP   Impaired muscle performance - neuro re-education  Decreased function - therapeutic activities  Impaired posture - neuro re-education  STG/LTGs have been met or progress has been made towards goals:  Yes (See Goal flow sheet completed today.)  Assessment of Progress: The  patient's condition is improving.  Re: Lani Lewis   :   1985    The patient's condition has potential to improve.  Self Management Plans:  Patient is independent in a home treatment program.    Lani continues to require the following intervention to meet STG and LTG's:  PT intervention is no longer required to meet STG/LTG.    Recommendations:  This patient is ready to be discharged from therapy and continue their home treatment program.    Please refer to the daily flowsheet for treatment today, total treatment time and time spent performing 1:1 timed codes.        Thank you for your referral.    INQUIRIES  Therapist: ARMIDA West   [facility]  [address]  [facility] [address]  Phone: [phone]  Fax: [phone]

## 2022-11-15 NOTE — PROGRESS NOTES
Subjective:  HPI  Physical Exam                    Objective:  System    Physical Exam    General     ROS    Assessment/Plan:    PROGRESS  REPORT    Progress reporting period is from 9/23/2021 to 10/21 2021.       SUBJECTIVE  Subjective changes noted by patient:   Patient has mild back pain today; stated the pain comes and goes; usually has pain with walking more than 10 min.  Patient stated the R ankle is better; pain in the toes at time.      Current pain level is : 4/10.     Previous pain level was  : 8/10 (previous back pain).   Changes in function:  Yes (See Goal flowsheet attached for changes in current functional level)  Adverse reaction to treatment or activity: activity - prolong activity increases back pain.    OBJECTIVE  Changes noted in objective findings:      TROM: flexion= touches toes, ext= min loss, SB= WNL.    LE strength is good.    Standing posture:  increase lumbar lordosis.     R ankle AROM:  DF=10, KY 42.  R ankle strength is good except PF (unable to perform a single TR on R side).     ASSESSMENT/PLAN  Updated problem list and treatment plan: Diagnosis 1:  R ankle fx  Pain -  home program  Decreased strength - therapeutic exercise and therapeutic activities  Diagnosis 2:  LBP   Impaired muscle performance - neuro re-education  Decreased function - therapeutic activities  Impaired posture - neuro re-education  STG/LTGs have been met or progress has been made towards goals:  Yes (See Goal flow sheet completed today.)  Assessment of Progress: The patient's condition is improving.  The patient's condition has potential to improve.  Self Management Plans:  Patient is independent in a home treatment program.    Lani continues to require the following intervention to meet STG and LTG's:  PT intervention is no longer required to meet STG/LTG.    Recommendations:  This patient is ready to be discharged from therapy and continue their home treatment program.    Please refer to the daily flowsheet for  treatment today, total treatment time and time spent performing 1:1 timed codes.           Spine appears normal, movement of extremities grossly intact.

## 2022-12-08 NOTE — PROGRESS NOTES
"SUBJECTIVE:      Lani Lewis is a 37 year old female who is here today for follow up of her ORIF right ankle fracture on 3/12/21  Syndesmosis repair?: yes, with 2 tightropes. Proximal fibular fracture.  Seen with the help of a phone .  Last seen 7/28/21. No pain or problems with the ankle at that time.  She works as a PCA.  She returns with occasional pain around the ankle, that is not activity related, and wonders if removing the hardware would help that pain.   She mentions that she has pain in the lateral leg/distal fibular area with walking, but she seems to have just accepted this pain. But when questioned, she says that's where most of her pain is.   She complains of of some numbness/tingling of the great toe, and feels that it curls differently.    Review of Systems:  Constitutional/General: Negative for fever, chills, change in weight  Integumentary/Skin: Negative for worrisome rashes, moles, or lesions  Neuro: Negative for weakness, dizziness, or paresthesias   Psychiatric: negative for changes in mood or affect    OBJECTIVE:  Physical Exam:  /74 (BP Location: Left arm, Patient Position: Sitting, Cuff Size: Adult Regular)   Pulse 69   Ht 1.727 m (5' 8\")   Wt 125.2 kg (276 lb)   SpO2 100%   BMI 41.97 kg/m    General Appearance: healthy, alert and no distress   Skin: no suspicious lesions or rashes  Neuro: Normal strength and tone, mentation intact and speech normal  Vascular: good pulses, and capillary refill   Lymph: no lymphadenopathy   Psych:  mentation appears normal and affect normal/bright  Resp: no increased work of breathing    Ankle:   No definite swelling  full range of motion of the ankle without pain   No definite tenderness over the malleoli  She does have some tenderness over the fibula shaft  Normal motor function of the foot, and toes    X-rays:  Obtained today of the right ankle: 3-views, reviewed in the office with the patient by myself today and show that the " mortice is intact. The inferior button remains intra-articular, since it pulled through the cortex intraoperatively, but held some fixation. There is some fibular shortening, and the fibula fracture is not healed.  No definite degenerative changes of the ankle.     ASSESSMENT:   Fibular fracture non union right side.  Shortening of the distal fibula, which could change the biomechanics of the ankle and lead to further ankle pain, possibly osteoarthritis  I told her that the hardware is unlikely to be the cause of the pain.    PLAN:     We discussed another procedure to possibly plate the fibular shaft fracture. I think the shortening of the distal fibula could be an issue as well. I don't recommend hardware removal only, just because the other pathology present may be causing the issues.  But if she insisted on having the hardware out without other procedures, this could be done.  I've suggested she consult with our ankle specialist, Dr. Saenz for his take on the distal fibular shortening, and if any action should be taken.      THONG Medeiros MD  Dept. Orthopedic Surgery  [facility]

## 2022-12-13 ENCOUNTER — ANCILLARY PROCEDURE (OUTPATIENT)
Dept: GENERAL RADIOLOGY | Facility: CLINIC | Age: 37
End: 2022-12-13
Attending: ORTHOPAEDIC SURGERY
Payer: COMMERCIAL

## 2022-12-13 ENCOUNTER — OFFICE VISIT (OUTPATIENT)
Dept: ORTHOPEDICS | Facility: CLINIC | Age: 37
End: 2022-12-13
Payer: COMMERCIAL

## 2022-12-13 VITALS
BODY MASS INDEX: 41.83 KG/M2 | HEIGHT: 68 IN | HEART RATE: 69 BPM | WEIGHT: 276 LBS | DIASTOLIC BLOOD PRESSURE: 74 MMHG | OXYGEN SATURATION: 100 % | SYSTOLIC BLOOD PRESSURE: 111 MMHG

## 2022-12-13 DIAGNOSIS — Z98.890 HISTORY OF ANKLE SURGERY: ICD-10-CM

## 2022-12-13 DIAGNOSIS — S82.431K: Primary | ICD-10-CM

## 2022-12-13 DIAGNOSIS — S93.431D SYNDESMOTIC DISRUPTION OF RIGHT ANKLE, SUBSEQUENT ENCOUNTER: ICD-10-CM

## 2022-12-13 DIAGNOSIS — S82.891D CLOSED FRACTURE OF RIGHT ANKLE WITH ROUTINE HEALING, SUBSEQUENT ENCOUNTER: ICD-10-CM

## 2022-12-13 PROCEDURE — 73610 X-RAY EXAM OF ANKLE: CPT | Mod: TC | Performed by: RADIOLOGY

## 2022-12-13 PROCEDURE — 99213 OFFICE O/P EST LOW 20 MIN: CPT | Performed by: ORTHOPAEDIC SURGERY

## 2022-12-13 RX ORDER — ACETAMINOPHEN 325 MG/1
325-650 TABLET ORAL
COMMUNITY
Start: 2021-02-03

## 2022-12-13 ASSESSMENT — PAIN SCALES - GENERAL: PAINLEVEL: NO PAIN (0)

## 2022-12-13 NOTE — LETTER
"    12/13/2022         RE: Lani Lewis  [address] [facility] [address]        Dear Colleague,    Thank you for referring your patient, Lani Lewis, to the [facility]. Please see a copy of my visit note below.    SUBJECTIVE:      Lani Lewis is a 37 year old female who is here today for follow up of her ORIF right ankle fracture on 3/12/21  Syndesmosis repair?: yes, with 2 tightropes. Proximal fibular fracture.  Seen with the help of a phone .  Last seen 7/28/21. No pain or problems with the ankle at that time.  She works as a PCA.  She returns with occasional pain around the ankle, that is not activity related, and wonders if removing the hardware would help that pain.   She mentions that she has pain in the lateral leg/distal fibular area with walking, but she seems to have just accepted this pain. But when questioned, she says that's where most of her pain is.   She complains of of some numbness/tingling of the great toe, and feels that it curls differently.    Review of Systems:  Constitutional/General: Negative for fever, chills, change in weight  Integumentary/Skin: Negative for worrisome rashes, moles, or lesions  Neuro: Negative for weakness, dizziness, or paresthesias   Psychiatric: negative for changes in mood or affect    OBJECTIVE:  Physical Exam:  /74 (BP Location: Left arm, Patient Position: Sitting, Cuff Size: Adult Regular)   Pulse 69   Ht 1.727 m (5' 8\")   Wt 125.2 kg (276 lb)   SpO2 100%   BMI 41.97 kg/m    General Appearance: healthy, alert and no distress   Skin: no suspicious lesions or rashes  Neuro: Normal strength and tone, mentation intact and speech normal  Vascular: good pulses, and capillary refill   Lymph: no lymphadenopathy   Psych:  mentation appears normal and affect normal/bright  Resp: no increased work of breathing    Ankle:   No definite swelling  full range of motion of the ankle without pain   No definite " tenderness over the malleoli  She does have some tenderness over the fibula shaft  Normal motor function of the foot, and toes    X-rays:  Obtained today of the right ankle: 3-views, reviewed in the office with the patient by myself today and show that the mortice is intact. The inferior button remains intra-articular, since it pulled through the cortex intraoperatively, but held some fixation. There is some fibular shortening, and the fibula fracture is not healed.  No definite degenerative changes of the ankle.     ASSESSMENT:   Fibular fracture non union right side.  Shortening of the distal fibula, which could change the biomechanics of the ankle and lead to further ankle pain, possibly osteoarthritis  I told her that the hardware is unlikely to be the cause of the pain.    PLAN:     We discussed another procedure to possibly plate the fibular shaft fracture. I think the shortening of the distal fibula could be an issue as well. I don't recommend hardware removal only, just because the other pathology present may be causing the issues.  But if she insisted on having the hardware out without other procedures, this could be done.  I've suggested she consult with our ankle specialist, Dr. Saenz for his take on the distal fibular shortening, and if any action should be taken.      THONG Medeiros MD  Dept. Orthopedic Surgery  [facility]           Again, thank you for allowing me to participate in the care of your patient.        Sincerely,        Eriberto Medeiros MD

## 2023-05-31 ENCOUNTER — ANCILLARY PROCEDURE (OUTPATIENT)
Dept: GENERAL RADIOLOGY | Facility: CLINIC | Age: 38
End: 2023-05-31
Attending: ORTHOPAEDIC SURGERY
Payer: COMMERCIAL

## 2023-05-31 ENCOUNTER — OFFICE VISIT (OUTPATIENT)
Dept: ORTHOPEDICS | Facility: CLINIC | Age: 38
End: 2023-05-31
Payer: COMMERCIAL

## 2023-05-31 VITALS
OXYGEN SATURATION: 100 % | WEIGHT: 276 LBS | HEART RATE: 78 BPM | SYSTOLIC BLOOD PRESSURE: 99 MMHG | HEIGHT: 69 IN | DIASTOLIC BLOOD PRESSURE: 62 MMHG | BODY MASS INDEX: 40.88 KG/M2

## 2023-05-31 DIAGNOSIS — M79.604 PAIN OF RIGHT LOWER EXTREMITY: ICD-10-CM

## 2023-05-31 DIAGNOSIS — M71.21 SYNOVIAL CYST OF RIGHT POPLITEAL SPACE: ICD-10-CM

## 2023-05-31 DIAGNOSIS — E66.01 MORBID OBESITY (H): ICD-10-CM

## 2023-05-31 DIAGNOSIS — S82.431K: ICD-10-CM

## 2023-05-31 DIAGNOSIS — Z98.890 HISTORY OF ANKLE SURGERY: Primary | ICD-10-CM

## 2023-05-31 DIAGNOSIS — Z98.890 HISTORY OF ANKLE SURGERY: ICD-10-CM

## 2023-05-31 DIAGNOSIS — S93.431D SYNDESMOTIC DISRUPTION OF RIGHT ANKLE, SUBSEQUENT ENCOUNTER: ICD-10-CM

## 2023-05-31 PROCEDURE — 99214 OFFICE O/P EST MOD 30 MIN: CPT | Performed by: ORTHOPAEDIC SURGERY

## 2023-05-31 PROCEDURE — 73610 X-RAY EXAM OF ANKLE: CPT | Mod: TC | Performed by: RADIOLOGY

## 2023-05-31 ASSESSMENT — PAIN SCALES - GENERAL: PAINLEVEL: EXTREME PAIN (8)

## 2023-05-31 NOTE — LETTER
"    5/31/2023         RE: Lani Lewis  [address] [facility] [address]        Dear Colleague,    Thank you for referring your patient, Lani Lewis, to the [facility]. Please see a copy of my visit note below.    SUBJECTIVE:      Lani Lewis is a 37 year old female who is here today for follow up of her ORIF right ankle fracture on 3/12/21  Syndesmosis repair, with 2 tightropes. Proximal fibular fracture.  Seen with the help of a phone .  Last seen 12/13/22.  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  Shortening of the distal fibula, which could change the biomechanics of the ankle and lead to further ankle pain, possibly osteoarthritis  I told her that the hardware is unlikely to be the cause of the pain.    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     Review of Systems:  Constitutional/General: Negative for fever, chills, change in weight  Integumentary/Skin: Negative for worrisome rashes, moles, or lesions  Neuro: Negative for weakness, dizziness, or paresthesias   Psychiatric: negative for changes in mood or affect    OBJECTIVE:  Physical Exam:  BP 99/62 (BP Location: Left arm, Patient Position: Sitting, Cuff Size: Adult Large)   Pulse 78   Ht 1.753 m (5' 9\")   Wt 125.2 kg (276 lb)   SpO2 100%   BMI 40.76 kg/m    General Appearance: healthy, alert and no distress   Skin: no suspicious lesions or rashes  Neuro: Normal strength and tone, mentation intact and speech normal  Vascular: good pulses, and capillary refill   Lymph: no lymphadenopathy   Psych:  mentation appears normal and affect normal/bright  Resp: no increased work of breathing    Ankle:   No definite swelling  full range of motion of the ankle without pain   No definite " tenderness over the malleoli  She does have some tenderness over the fibula shaft, lateral lower leg, diffusely about the knee, and thigh, greater trochanter.  Non-tender sciatic notch.     Normal motor function of the foot, and toes    She has full range of motion of the right hip without pain  Good hip strength.       full range of motion of the knee  No definite swelling  Fullness in popliteal space  Pain with full flexion and Bharath's   Ligaments stable    X-rays:  From today:   Obtained today of the right ankle: 3-views, reviewed in the office with the patient by myself today and show that the mortice is intact. The inferior button remains intra-osseous, since it pulled through the cortex intraoperatively, but held some fixation. There is some fibular shortening, and the fibula fracture is not healed.  There may be some early degenerative changes of the ankle.    Previous full tibial films show the knee without osteoarthritis      ASSESSMENT:   Fibular fracture non union right side.  Shortening of the distal fibula, which could change the biomechanics of the ankle and lead to further ankle pain, possibly osteoarthritis  I told her that the hardware is unlikely to be the cause of the pain.  Possible popliteal cyst and/or meniscal tear right knee    PLAN:     We discussed another procedure to possibly plate the fibular shaft fracture. I think the shortening of the distal fibula could be an issue as well, or could become one in the future. I don't recommend hardware removal only, just because the other pathology present may be causing the issues.  But if she insisted on having the hardware out without other procedures, this could be done.  I've suggested she consult with our ankle specialist, Dr. Saenz for his take on the distal fibular shortening, and if any action should be taken regarding the syndesmosis.     MRI of the right knee ordered. .      THONG Medeiros MD  Dept. Orthopedic Surgery  [facility]  Services           Again, thank you for allowing me to participate in the care of your patient.        Sincerely,        Eriberto Medeiros MD

## 2023-05-31 NOTE — PROGRESS NOTES
"SUBJECTIVE:      Lani Lewis is a 37 year old female who is here today for follow up of her ORIF right ankle fracture on 3/12/21  Syndesmosis repair, with 2 tightropes. Proximal fibular fracture.  Seen with the help of a phone .  Last seen 12/13/22.  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  Shortening of the distal fibula, which could change the biomechanics of the ankle and lead to further ankle pain, possibly osteoarthritis  I told her that the hardware is unlikely to be the cause of the pain.    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     Review of Systems:  Constitutional/General: Negative for fever, chills, change in weight  Integumentary/Skin: Negative for worrisome rashes, moles, or lesions  Neuro: Negative for weakness, dizziness, or paresthesias   Psychiatric: negative for changes in mood or affect    OBJECTIVE:  Physical Exam:  BP 99/62 (BP Location: Left arm, Patient Position: Sitting, Cuff Size: Adult Large)   Pulse 78   Ht 1.753 m (5' 9\")   Wt 125.2 kg (276 lb)   SpO2 100%   BMI 40.76 kg/m    General Appearance: healthy, alert and no distress   Skin: no suspicious lesions or rashes  Neuro: Normal strength and tone, mentation intact and speech normal  Vascular: good pulses, and capillary refill   Lymph: no lymphadenopathy   Psych:  mentation appears normal and affect normal/bright  Resp: no increased work of breathing    Ankle:   No definite swelling  full range of motion of the ankle without pain   No definite tenderness over the malleoli  She does have some tenderness over the fibula shaft, lateral lower leg, diffusely about the knee, and thigh, greater trochanter.  Non-tender sciatic notch.     Normal motor function of the foot, and toes    She has full range " of motion of the right hip without pain  Good hip strength.       full range of motion of the knee  No definite swelling  Fullness in popliteal space  Pain with full flexion and Bharath's   Ligaments stable    X-rays:  From today:   Obtained today of the right ankle: 3-views, reviewed in the office with the patient by myself today and show that the mortice is intact. The inferior button remains intra-osseous, since it pulled through the cortex intraoperatively, but held some fixation. There is some fibular shortening, and the fibula fracture is not healed.  There may be some early degenerative changes of the ankle.    Previous full tibial films show the knee without osteoarthritis      ASSESSMENT:   Fibular fracture non union right side.  Shortening of the distal fibula, which could change the biomechanics of the ankle and lead to further ankle pain, possibly osteoarthritis  I told her that the hardware is unlikely to be the cause of the pain.  Possible popliteal cyst and/or meniscal tear right knee    PLAN:     We discussed another procedure to possibly plate the fibular shaft fracture. I think the shortening of the distal fibula could be an issue as well, or could become one in the future. I don't recommend hardware removal only, just because the other pathology present may be causing the issues.  But if she insisted on having the hardware out without other procedures, this could be done.  I've suggested she consult with our ankle specialist, Dr. Saenz for his take on the distal fibular shortening, and if any action should be taken regarding the syndesmosis.     MRI of the right knee ordered. .      THONG Medeiros MD  Dept. Orthopedic Surgery  [facility]

## 2023-06-05 ENCOUNTER — APPOINTMENT (OUTPATIENT)
Dept: INTERPRETER SERVICES | Facility: CLINIC | Age: 38
End: 2023-06-05
Payer: COMMERCIAL

## 2023-06-05 ENCOUNTER — TELEPHONE (OUTPATIENT)
Dept: ORTHOPEDICS | Facility: CLINIC | Age: 38
End: 2023-06-05
Payer: COMMERCIAL

## 2023-06-12 NOTE — TELEPHONE ENCOUNTER
Action June 12, 2023 3:56 PM MT   Action Taken Called [facility] and left voicemail for imaging to be pushed. 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 [S82.431K]       Syndesmotic disruption of right ankle, subsequent encounter [S93.431D]       Morbid obesity (H) [E66.01]          APPOINTMENT DATE: 06/13/2023   NOTES STATUS DETAILS   OFFICE NOTE from referring provider Internal 05/31/2023 - Eriberto Medeiros MD - [facility] Ortho   OFFICE NOTE from other specialist Internal [facility] Physical Therapy   DISCHARGE REPORT from the ER Care Everywhere 03/22/2021, 03/12/2021 - [facility]   OPERATIVE REPORT Care Everywhere 03/12/2021 - RT Ankle ORIF   MEDICATION LIST Internal  Care Everywhere    IMPLANT RECORD/STICKER Care Everywhere    LABS     XRAYS (IMAGES & REPORTS) PACS Internal    [facility]:  03/22/2021 - RT Foot  03/22/2021 - RT Ankle  03/12/2021 - [facility] RT Extremity

## 2023-06-13 ENCOUNTER — PRE VISIT (OUTPATIENT)
Dept: ORTHOPEDICS | Facility: CLINIC | Age: 38
End: 2023-06-13

## 2023-06-13 ENCOUNTER — OFFICE VISIT (OUTPATIENT)
Dept: ORTHOPEDICS | Facility: CLINIC | Age: 38
End: 2023-06-13
Payer: COMMERCIAL

## 2023-06-13 VITALS — BODY MASS INDEX: 44.41 KG/M2 | WEIGHT: 293 LBS | HEIGHT: 68 IN

## 2023-06-13 DIAGNOSIS — M79.604 PAIN OF RIGHT LOWER EXTREMITY: Primary | ICD-10-CM

## 2023-06-13 DIAGNOSIS — E66.01 MORBID OBESITY (H): ICD-10-CM

## 2023-06-13 DIAGNOSIS — S82.431K: ICD-10-CM

## 2023-06-13 DIAGNOSIS — Z98.890 HISTORY OF ANKLE SURGERY: ICD-10-CM

## 2023-06-13 DIAGNOSIS — S93.431D SYNDESMOTIC DISRUPTION OF RIGHT ANKLE, SUBSEQUENT ENCOUNTER: ICD-10-CM

## 2023-06-13 PROCEDURE — 99213 OFFICE O/P EST LOW 20 MIN: CPT | Performed by: ORTHOPAEDIC SURGERY

## 2023-06-13 NOTE — LETTER
6/13/2023         RE: Lani Lewis  [address]  [place] [address]        Dear Colleague,    Thank you for referring your patient, Lani Lewis, to the [facility] [place]. Please see a copy of my visit note below.    Chief complaint right ankle and lower leg pain    History present illness:Lani Lewis is a 38-year-old female who presents today with in the company of her daughter for evaluation of her right lower leg.  Patient reports to have sustained an ankle fracture which was surgically addressed by Dr. Medeiros.  Unfortunately as a result of that surgery she has developed a shortening of the lateral malleolus with a possible nonunion at the level of the fracture site which is by the junction of the distal middle third of the lower leg.  Patient reports to have pain and discomfort standing extending all the way from the potential nonunion down to the ankle joint.  Patient reports that the pain is activity related    Presents today for discussion of treatment options.  The whole interview was maintained through an .    We reviewed today her past medical and surgical history current medications and drug allergies.  Patient presents with a weight of 300 pounds with a BMI of 45.6    On today's visit she presents with full range of motion of the right ankle hindfoot and midfoot joints CMS intact skin is intact there is pain with palpation of the fracture site for the fibula.  There is no effusion there is well-healed surgical incisions there is palpable pulses.    Plain x-rays of the ankle from a previous visit were reviewed today which are significant for showing a fair amount of shortening of the fibula which probably also some rotational and mild union.  The ankle syndesmosis congruent medial malleolus is healed.    Assessment right ankle and lower leg pain secondary to possible fibula  Mild union versus nonunion    Plan discussed with the patient and  her daughter that at this point I would like to proceed with a CT scan of the ankle joint to rule out a nonunion or malunion of the fibula.  Based on the findings further recommendation will be given to the patient    Also discussed with the patient that the only way to improve her discomfort will be through a surgical intervention.    All questions were answered.  She has no restrictions.  She will be contacted by phone once the CT scan is available for review    TT 30 minutes      Buck Saenz MD

## 2023-06-13 NOTE — NURSING NOTE
"Reason For Visit:   Chief Complaint   Patient presents with     Consult     Right lower leg pain. Right ankle ORIF 3/12/21 with Dr. Medeiros.  Referred for fibular shaft non union. Painful every day. XR 5/31/23       Ht 1.727 m (5' 8\")   Wt 136.1 kg (300 lb)   BMI 45.61 kg/m           Valentina Zavala, KIRK    "

## 2023-06-13 NOTE — PROGRESS NOTES
Chief complaint right ankle and lower leg pain    History present illness:Lani Lewis is a 38-year-old female who presents today with in the company of her daughter for evaluation of her right lower leg.  Patient reports to have sustained an ankle fracture which was surgically addressed by Dr. Medeiros.  Unfortunately as a result of that surgery she has developed a shortening of the lateral malleolus with a possible nonunion at the level of the fracture site which is by the junction of the distal middle third of the lower leg.  Patient reports to have pain and discomfort standing extending all the way from the potential nonunion down to the ankle joint.  Patient reports that the pain is activity related    Presents today for discussion of treatment options.  The whole interview was maintained through an .    We reviewed today her past medical and surgical history current medications and drug allergies.  Patient presents with a weight of 300 pounds with a BMI of 45.6    On today's visit she presents with full range of motion of the right ankle hindfoot and midfoot joints CMS intact skin is intact there is pain with palpation of the fracture site for the fibula.  There is no effusion there is well-healed surgical incisions there is palpable pulses.    Plain x-rays of the ankle from a previous visit were reviewed today which are significant for showing a fair amount of shortening of the fibula which probably also some rotational and mild union.  The ankle syndesmosis congruent medial malleolus is healed.    Assessment right ankle and lower leg pain secondary to possible fibula  Mild union versus nonunion    Plan discussed with the patient and her daughter that at this point I would like to proceed with a CT scan of the ankle joint to rule out a nonunion or malunion of the fibula.  Based on the findings further recommendation will be given to the patient    Also discussed with the patient that the only way  to improve her discomfort will be through a surgical intervention.    All questions were answered.  She has no restrictions.  She will be contacted by phone once the CT scan is available for review    TT 30 minutes

## 2023-06-14 ENCOUNTER — ANCILLARY PROCEDURE (OUTPATIENT)
Dept: CT IMAGING | Facility: CLINIC | Age: 38
End: 2023-06-14
Attending: ORTHOPAEDIC SURGERY
Payer: COMMERCIAL

## 2023-06-14 DIAGNOSIS — M79.604 PAIN OF RIGHT LOWER EXTREMITY: ICD-10-CM

## 2023-06-14 PROCEDURE — 73700 CT LOWER EXTREMITY W/O DYE: CPT | Mod: RT | Performed by: RADIOLOGY

## 2023-06-20 ENCOUNTER — VIRTUAL VISIT (OUTPATIENT)
Dept: ORTHOPEDICS | Facility: CLINIC | Age: 38
End: 2023-06-20
Payer: COMMERCIAL

## 2023-06-20 DIAGNOSIS — M25.571 PAIN IN JOINT, ANKLE AND FOOT, RIGHT: Primary | ICD-10-CM

## 2023-06-20 PROCEDURE — 99207 PR NO BILLABLE SERVICE THIS VISIT: CPT | Mod: 95 | Performed by: ORTHOPAEDIC SURGERY

## 2023-06-20 NOTE — PROGRESS NOTES
Chief complaint is status post ankle open reduction internal fixation.    Lani Lewis was interviewed via phone.  Patient authorized encounter.   was available through the whole conversation    I discussed with the patient that the CT scan is significant for showing a nonunion of the fibula.  I also discussed with her that a complex union with anatomic relocation of the fibula would be a fairly difficult surgery as for the most part we need to completely skeletonized the fibula to bring it down and remove most of the soft tissue attachments.    Given the fact that she has barely any discomfort I would suggest to proceed with observation.  Patient was in agreement with the plan.    Patient will follow-up on as needed basis.  She has no restrictions.  All questions were answered.    I spent 17 minutes with the patient on the phone on today's visit.

## 2023-06-20 NOTE — LETTER
6/20/2023         RE: Lani Lewis  [address]  [place] [address]        Dear Colleague,    Thank you for referring your patient, Lani Lewis, to the [facility] [place]. Please see a copy of my visit note below.    Chief complaint is status post ankle open reduction internal fixation.    Lani Lewis was interviewed via phone.  Patient authorized encounter.   was available through the whole conversation    I discussed with the patient that the CT scan is significant for showing a nonunion of the fibula.  I also discussed with her that a complex union with anatomic relocation of the fibula would be a fairly difficult surgery as for the most part we need to completely skeletonized the fibula to bring it down and remove most of the soft tissue attachments.    Given the fact that she has barely any discomfort I would suggest to proceed with observation.  Patient was in agreement with the plan.    Patient will follow-up on as needed basis.  She has no restrictions.  All questions were answered.    I spent 17 minutes with the patient on the phone on today's visit.      Sincerely,        Buck Saenz MD

## 2023-10-19 ENCOUNTER — ANCILLARY PROCEDURE (OUTPATIENT)
Dept: GENERAL RADIOLOGY | Facility: CLINIC | Age: 38
End: 2023-10-19
Attending: FAMILY MEDICINE
Payer: COMMERCIAL

## 2023-10-19 ENCOUNTER — OFFICE VISIT (OUTPATIENT)
Dept: FAMILY MEDICINE | Facility: CLINIC | Age: 38
End: 2023-10-19
Payer: COMMERCIAL

## 2023-10-19 VITALS
BODY MASS INDEX: 43.64 KG/M2 | DIASTOLIC BLOOD PRESSURE: 74 MMHG | HEART RATE: 77 BPM | OXYGEN SATURATION: 100 % | SYSTOLIC BLOOD PRESSURE: 99 MMHG | WEIGHT: 287 LBS | TEMPERATURE: 97.8 F

## 2023-10-19 DIAGNOSIS — Z87.81 HISTORY OF FIBULA FRACTURE: ICD-10-CM

## 2023-10-19 DIAGNOSIS — E66.01 MORBID OBESITY (H): ICD-10-CM

## 2023-10-19 DIAGNOSIS — Z12.4 CERVICAL CANCER SCREENING: ICD-10-CM

## 2023-10-19 DIAGNOSIS — M25.561 RIGHT KNEE PAIN, UNSPECIFIED CHRONICITY: ICD-10-CM

## 2023-10-19 DIAGNOSIS — M54.50 RIGHT-SIDED LOW BACK PAIN WITHOUT SCIATICA, UNSPECIFIED CHRONICITY: ICD-10-CM

## 2023-10-19 PROCEDURE — 73562 X-RAY EXAM OF KNEE 3: CPT | Mod: TC | Performed by: RADIOLOGY

## 2023-10-19 PROCEDURE — 99214 OFFICE O/P EST MOD 30 MIN: CPT | Performed by: FAMILY MEDICINE

## 2023-10-19 RX ORDER — NAPROXEN 500 MG/1
500 TABLET ORAL 2 TIMES DAILY WITH MEALS
Qty: 30 TABLET | Refills: 0 | Status: SHIPPED | OUTPATIENT
Start: 2023-10-19

## 2023-10-19 NOTE — PROGRESS NOTES
"  Assessment & Plan     Right-sided low back pain without sciatica, unspecified chronicity  Advised PT  NSAID as Given  - Physical Therapy Referral; Future    Right knee pain, unspecified chronicity  As above   Her ankle pain could be causing knee pain  - XR Knee Right 3 Views; Future  - naproxen (NAPROSYN) 500 MG tablet; Take 1 tablet (500 mg) by mouth 2 times daily (with meals)  - Physical Therapy Referral; Future    Morbid obesity (H)  Discussed need to Lose wt so she can get her surgery  - Adult Comprehensive Weight Management  Referral; Future  Referral done  Diet discussed   History of fibula [id number]  Ortho notes reviewed     Cervical cancer screening  She is due for pap    Ordering of each unique test  Prescription drug management         BMI:   Estimated body mass index is 43.64 kg/m  as calculated from the following:    Height as of 6/13/23: 1.727 m (5' 8\").    Weight as of this encounter: 130.2 kg (287 lb).   Weight management plan: Discussed healthy diet and exercise guidelines Specific weight management program called [facility] discussed  Time spent reviewing chart, addressing her medical Problems as above, ordering labs, refilling meds and discussing her medical Problems, , documenting-Labs will be reviewed when back and will make further recommendations based on her labs32 minutes      Follow up PCP 6 weeks    Dede Townsend MD  [facility] DESEAN Garibay is a 38 year old, presenting for the following health issues:  Pain (Right leg/)        10/19/2023    11:26 AM   Additional Questions   Roomed by Gabriella MIRANDA       Concern - Right leg pain-from knee to hip  Onset: 4 months ago  Progression of Symptoms:  worsening  Previous history of similar problem: yes seen in Emergency room in June 2023  Precipitating factors:        Worsened by: none  Alleviating factors:        Improved by: none  Therapies tried and outcome: Naproxen  [T was diagnosed with Hamstring " tendonitis  Pt continues to have pain medial aspect of Knee, Right Lower back and pain Right side of her  Right Leg  She also has had a Fibular Fracture and was seen By Dr Medeiros-she had ORIF with malunion  She was advised surgery and was advised to Lose weight   Pt still gets pain Right ankle  Notes reviewed       Review of Systems   Rest of the  pertinent ROS is Negative except see above and Problem list [stable]        Objective    BP 99/74   Pulse 77   Temp 97.8  F (36.6  C) (Oral)   Wt 130.2 kg (287 lb)   SpO2 100%   BMI 43.64 kg/m    Body mass index is 43.64 kg/m .  Physical Exam   GENERAL: alert, no distress, and obese  RESP: lungs clear to auscultation - no rales, rhonchi or wheezes  CV: regular rate and rhythm, normal S1 S2, no S3 or S4, no murmur, click or rub, no peripheral edema and peripheral pulses strong  ABDOMEN: soft, nontender, no hepatosplenomegaly, no masses and bowel sounds normal  MS: no gross musculoskeletal defects noted, no edema  MS: Right knee -no swelling  Tenderness medial Joint line  ROM is Good  No tenderness Right Hip  Pain Right Lower back on extension  No spine tenderness   Straight leg raise is negative  NEURO: Normal strength and tone, mentation intact and speech normal

## 2023-10-20 ENCOUNTER — APPOINTMENT (OUTPATIENT)
Dept: INTERPRETER SERVICES | Facility: CLINIC | Age: 38
End: 2023-10-20
Payer: COMMERCIAL

## 2023-10-20 ENCOUNTER — TELEPHONE (OUTPATIENT)
Dept: FAMILY MEDICINE | Facility: CLINIC | Age: 38
End: 2023-10-20

## 2023-10-20 DIAGNOSIS — M19.90 ARTHRITIS: Primary | ICD-10-CM

## 2023-10-20 NOTE — TELEPHONE ENCOUNTER
Dr. Townsend,    Spoke with patient via use of  and notified of providers advise about xray results.     TE started. Patient interested in PT. Order cued. Verbalized understanding for plan.     Thanks,  LLOYD Villatoro  [facility]

## 2023-10-20 NOTE — TELEPHONE ENCOUNTER
----- Message from Dede Townsend MD sent at 10/19/2023  3:57 PM CDT -----  Xray shows Moderate arthritis  Please consider Physical Therapy  Follow up Dr Medeiros if not better  Sincerely,  Dede Townsend MD

## 2023-11-28 ENCOUNTER — APPOINTMENT (OUTPATIENT)
Dept: INTERPRETER SERVICES | Facility: CLINIC | Age: 38
End: 2023-11-28
Payer: COMMERCIAL

## 2024-03-12 ENCOUNTER — TELEPHONE (OUTPATIENT)
Dept: ENDOCRINOLOGY | Facility: CLINIC | Age: 39
End: 2024-03-12
Payer: COMMERCIAL

## 2024-03-12 NOTE — TELEPHONE ENCOUNTER
Left Voicemail (1st Attempt) to inform the patient of the following:    Appointment type: NEW [facility]  Provider: Marielena Nguyen  Return date: 4/1/24    Additonal Notes: This appointment has been changed from in person to virtual (per provider request)

## 2024-04-17 ENCOUNTER — TELEPHONE (OUTPATIENT)
Dept: FAMILY MEDICINE | Facility: CLINIC | Age: 39
End: 2024-04-17
Payer: COMMERCIAL

## 2024-04-17 NOTE — LETTER
April 17, 2024          To  Lani Lewis  [address]  [place] [address]          Your team at [facility] cares about your health. We have reviewed your chart and based on our findings; we are making the following recommendations to better manage your health.     You are in particular need of attention regarding the following:     Schedule a primary care office visit with your provider for a Pap Smear to screen for Cervical Cancer.  PREVENTATIVE VISIT: Physical    If you have already completed these items, please contact the clinic via phone or   [facility] so your care team can review and update your records. Thank you for   choosing [facility] for your healthcare needs. For any questions,   concerns, or to schedule an appointment please contact our clinic.    Healthy Regards,      Your [facility] Care Team

## 2024-04-17 NOTE — TELEPHONE ENCOUNTER
Patient Quality Outreach    Patient is due for the following:   Cervical Cancer Screening - PAP Needed  Physical Preventive Adult Physical    Next Steps:   Schedule a Adult Preventative    Type of outreach:    Sent letter.    Next Steps:  Reach out within 90 days via Phone.    Max number of attempts reached: No. Will try again in 90 days if patient still on fail list.    Questions for provider review:    None           TERRELL GARCIA MA  Chart routed to self.

## 2024-05-06 ENCOUNTER — OFFICE VISIT (OUTPATIENT)
Dept: URGENT CARE | Facility: URGENT CARE | Age: 39
End: 2024-05-06
Payer: COMMERCIAL

## 2024-05-06 VITALS
DIASTOLIC BLOOD PRESSURE: 75 MMHG | HEART RATE: 90 BPM | OXYGEN SATURATION: 100 % | TEMPERATURE: 97.3 F | RESPIRATION RATE: 18 BRPM | SYSTOLIC BLOOD PRESSURE: 113 MMHG

## 2024-05-06 DIAGNOSIS — J03.90 EXUDATIVE TONSILLITIS: Primary | ICD-10-CM

## 2024-05-06 DIAGNOSIS — R07.0 THROAT PAIN: ICD-10-CM

## 2024-05-06 DIAGNOSIS — H66.92 LEFT ACUTE OTITIS MEDIA: ICD-10-CM

## 2024-05-06 LAB
DEPRECATED S PYO AG THROAT QL EIA: NEGATIVE
GROUP A STREP BY PCR: NOT DETECTED

## 2024-05-06 PROCEDURE — 87651 STREP A DNA AMP PROBE: CPT

## 2024-05-06 PROCEDURE — 99213 OFFICE O/P EST LOW 20 MIN: CPT

## 2024-05-06 ASSESSMENT — ENCOUNTER SYMPTOMS
SORE THROAT: 1
FEVER: 1

## 2024-05-06 ASSESSMENT — PAIN SCALES - GENERAL: PAINLEVEL: WORST PAIN (10)

## 2024-05-06 NOTE — PROGRESS NOTES
Patient presents with:  Throat Problem: X's 3 days no fever, left side mainly hurts       Clinical Decision Makin-year-old well-appearing female, presenting with sore throat x 3 days, left-sided ear discomfort, subjective fevers this morning.  Exam is remarkable for superficial anterior cervical chain adenopathy, posterior oropharyngeal erythema, with exudative tonsillitis, tonsils 3+ bilaterally, uvula is midline, no peritonsillar abscess or retropharyngeal abscess visualized.  She is able to handle her secretions, airway patent.  Left TM erythematous with purulent middle ear effusion.  LS clear throughout, VSS on RA.    Rapid strep is negative.  PCR pending.  Rx Augmentin twice daily x 10 days for exudative tonsillitis and left AOM.    At the end of the encounter, I discussed results, diagnosis, medications. Discussed red flags for immediate return to clinic/ER, as well as indications for follow up if no improvement. Patient understood and agreed to plan. Patient was stable for discharge.    ICD-10-CM    1. Exudative tonsillitis  J03.90 amoxicillin-clavulanate (AUGMENTIN) 875-125 MG tablet      2. Left acute otitis media  H66.92 amoxicillin-clavulanate (AUGMENTIN) 875-125 MG tablet      3. Throat pain  R07.0 Streptococcus A Rapid Screen w/Reflex to PCR - Clinic Collect     Group A Streptococcus PCR Throat Swab          Patient Instructions   Take Augmentin twice a day for a total of 10 days for left ear infection and throat infection.     Rapid strep resulted negative. Culture is pending. Will let you know if this results positive.     Try warm salt water gargles, tea with honey, popsicles, stay hydrated.      Go to ER if you are experiencing shortness of breath, difficulty catching your breath, difficulty speaking in full sentences, chest pain, sweating, dizziness/light headedness, confusion/lethargy/altered mental status.      If no improvement or worsening in the next 1-2 days, please seek care  immediately.     HPI:  Lani Lewis is a 38 year old female who presents today with sore throat x 3 days, L>R side. Did feel feverish this morning, did not check temp. Took ibuprofen with some relief. Has also noticed some left ear discomfort. No coughing. No body aches. No known sick contacts.     Not currently breastfeeding. No concern for pregnancy. No concern for STI.     History obtained from the patient.    Problem List:  2023-10: History of fibula [id number]  2021: Chronic midline low back pain without sciatica  2021: Morbid obesity (H)  2021: Gait difficulty  2021: Pain in joint, ankle and foot, right  2021: Aftercare following surgery of the musculoskeletal system  2019: Nexplanon in place  2018-10:  (spontaneous vaginal delivery)  2018-10: Indication for care in labor and delivery, antepartum  2018: Encounter for supervision of other normal pregnancy in third   trimester  2018: Need for Tdap vaccination  2018: History of gestational diabetes  2018: History of macrosomia in infant in prior pregnancy,   currently pregnant  2018: Maternal obesity affecting pregnancy, antepartum  2010: Female genital infibulation  2010: H/O vitamin D deficiency      Past Medical History:   Diagnosis Date    NO ACTIVE PROBLEMS        Social History     Tobacco Use    Smoking status: Never    Smokeless tobacco: Never   Substance Use Topics    Alcohol use: No       Review of Systems   Constitutional:  Positive for fever.   HENT:  Positive for ear pain and sore throat.    All other systems reviewed and are negative.      Vitals:    24 1027   BP: 113/75   Pulse: 90   Resp: 18   Temp: 97.3  F (36.3  C)   TempSrc: Oral   SpO2: 100%       Physical Exam  Constitutional:       General: She is not in acute distress.     Appearance: Normal appearance. She is not ill-appearing, toxic-appearing or diaphoretic.   HENT:      Head: Normocephalic and atraumatic.      Right Ear:  Tympanic membrane, ear canal and external ear normal. No middle ear effusion. There is no impacted cerumen. Tympanic membrane is not perforated or erythematous.      Left Ear: Ear canal and external ear normal. A middle ear effusion is present. There is no impacted cerumen. Tympanic membrane is erythematous. Tympanic membrane is not perforated.      Nose: Nose normal.      Mouth/Throat:      Mouth: Mucous membranes are moist.      Tongue: No lesions. Tongue does not deviate from midline.      Palate: No mass and lesions.      Pharynx: Uvula midline. Posterior oropharyngeal erythema present. No pharyngeal swelling, oropharyngeal exudate or uvula swelling.      Tonsils: Tonsillar exudate present. No tonsillar abscesses. 3+ on the right. 3+ on the left.   Eyes:      General: No scleral icterus.        Right eye: No discharge.         Left eye: No discharge.      Conjunctiva/sclera: Conjunctivae normal.   Cardiovascular:      Rate and Rhythm: Normal rate and regular rhythm.      Heart sounds: Normal heart sounds. No murmur heard.     No friction rub. No gallop.   Pulmonary:      Effort: Pulmonary effort is normal. No respiratory distress.      Breath sounds: Normal breath sounds. No stridor. No wheezing, rhonchi or rales.   Chest:      Chest wall: No tenderness.   Musculoskeletal:      Cervical back: Neck supple. No rigidity or tenderness.   Lymphadenopathy:      Cervical: Cervical adenopathy present.      Right cervical: Superficial cervical adenopathy present.      Left cervical: Superficial cervical adenopathy present.   Skin:     General: Skin is warm.      Capillary Refill: Capillary refill takes less than 2 seconds.      Findings: No rash.   Neurological:      General: No focal deficit present.      Mental Status: She is alert and oriented to person, place, and time.   Psychiatric:         Mood and Affect: Mood normal.         Behavior: Behavior normal.         Thought Content: Thought content normal.          Judgment: Judgment normal.         Results:  Results for orders placed or performed in visit on 05/06/24   Streptococcus A Rapid Screen w/Reflex to PCR - Clinic Collect     Status: Normal    Specimen: Throat; Swab   Result Value Ref Range    Group A Strep antigen Negative Negative

## 2024-05-06 NOTE — PATIENT INSTRUCTIONS
Take Augmentin twice a day for a total of 10 days for left ear infection and throat infection.     Rapid strep resulted negative. Culture is pending. Will let you know if this results positive.     Try warm salt water gargles, tea with honey, popsicles, stay hydrated.      Go to ER if you are experiencing shortness of breath, difficulty catching your breath, difficulty speaking in full sentences, chest pain, sweating, dizziness/light headedness, confusion/lethargy/altered mental status.      If no improvement or worsening in the next 1-2 days, please seek care immediately.

## 2024-09-03 ENCOUNTER — OFFICE VISIT (OUTPATIENT)
Dept: URGENT CARE | Facility: URGENT CARE | Age: 39
End: 2024-09-03
Payer: COMMERCIAL

## 2024-09-03 ENCOUNTER — ANCILLARY PROCEDURE (OUTPATIENT)
Dept: GENERAL RADIOLOGY | Facility: CLINIC | Age: 39
End: 2024-09-03
Attending: PHYSICIAN ASSISTANT
Payer: COMMERCIAL

## 2024-09-03 VITALS
WEIGHT: 292.3 LBS | HEART RATE: 71 BPM | TEMPERATURE: 98.3 F | RESPIRATION RATE: 24 BRPM | SYSTOLIC BLOOD PRESSURE: 131 MMHG | OXYGEN SATURATION: 100 % | DIASTOLIC BLOOD PRESSURE: 78 MMHG | BODY MASS INDEX: 44.44 KG/M2

## 2024-09-03 DIAGNOSIS — M25.571 ACUTE RIGHT ANKLE PAIN: Primary | ICD-10-CM

## 2024-09-03 DIAGNOSIS — Z98.890 S/P SURGICAL MANIPULATION OF ANKLE JOINT: ICD-10-CM

## 2024-09-03 DIAGNOSIS — M25.571 ACUTE RIGHT ANKLE PAIN: ICD-10-CM

## 2024-09-03 DIAGNOSIS — R93.7 ABNORMAL X-RAY OF BONE: ICD-10-CM

## 2024-09-03 PROCEDURE — 73610 X-RAY EXAM OF ANKLE: CPT | Mod: TC | Performed by: RADIOLOGY

## 2024-09-03 PROCEDURE — 99214 OFFICE O/P EST MOD 30 MIN: CPT | Performed by: PHYSICIAN ASSISTANT

## 2024-09-03 NOTE — PROGRESS NOTES
Chief Complaint   Patient presents with    Urgent Care    Leg Pain     C/o pain and swelling in right lower leg onset last Wed. No known injury       X-ray-I see no obvious hardware loosening.    Results for orders placed or performed in visit on 09/03/24   XR Ankle Right G/E 3 Views     Status: None    Narrative    EXAM: XR ANKLE RIGHT G/E 3 VIEWS  LOCATION: [facility]  DATE: 9/3/2024    INDICATION:  S/P surgical manipulation of ankle joint, Acute right ankle pain  COMPARISON: None available at time of dictation.      Impression    IMPRESSION:     There are postoperative changes from syndesmotic fixation and medial malleolus fixation. Hardware appears intact and well seated. There is a chronic appearing, displaced and nonunited fracture of the distal fibular shaft. There is also a small chronic   ossicle adjacent to the medial malleolus which likely relates to remote injury. There is diffuse osseous demineralization. No acute, displaced fracture is identified. There is soft tissue swelling over the ankle.         ASSESSMENT:    ICD-10-CM    1. Acute right ankle pain  M25.571 XR Ankle Right G/E 3 Views     Orthopedic  Referral      2. S/P surgical manipulation of ankle joint  Z98.890 XR Ankle Right G/E 3 Views     Orthopedic  Referral      3. Abnormal x-ray of bone  R93.7 Orthopedic  Referral            PLAN: Right medial ankle pain for 1 week, no injury.  X-ray reads nonunited fracture of fibula.  See Ortho.  Saw Dr. Medeiros in the past.  Ice, Ace wrap.  Tylenol or ibuprofen.      Odessa Baca PA-C        SUBJECTIVE:  Lani Lewis is an 39 year old female who presents with    Past Medical History:   Diagnosis Date    NO ACTIVE PROBLEMS      History   Smoking Status    Never   Smokeless Tobacco    Never       ROS:  GEN no fevers  SKIN no erythema  Musculoskeletal:  See HPI.      OBJECTIVE:  Blood pressure 131/78, pulse 71, temperature 98.3  F (36.8   C), temperature source Tympanic, resp. rate 24, weight 132.6 kg (292 lb 4.8 oz), SpO2 100%, currently breastfeeding.  Patient is alert and NAD.  EYES: conjunctiva clear  Leg/Ankle Exam (right):  Inspection: Some mild swelling right medial ankle.  Palpation: Tender right medial ankle over the scar.    Cap refill intact.    Good doralis pedis.  Neurovascularly Intact Distally.         ENEDELIA McintyreC

## 2024-09-09 NOTE — PROGRESS NOTES
SUBJECTIVE:    Lani Lewis is a 39 year old female who is here today for follow up of her ORIF right ankle fracture on 3/12/21  Syndesmosis repair, with 2 tightropes. Proximal fibular fracture.  Seen with the help of a phone .  Last seen 12/13/22.  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  Shortening of the distal fibula, which could change the biomechanics of the ankle and lead to further ankle pain, possibly osteoarthritis  I told her that the hardware is unlikely to be the cause of the pain.    She saw Dr. 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  I also discussed with her that a complex union with anatomic relocation of the fibula would be a fairly difficult surgery as for the most part we need to completely skeletonized the fibula to bring it down and remove most of the soft tissue attachments.     Given the fact that she has barely any discomfort I would suggest to proceed with observation.  Patient was in agreement with the plan.    She presented to  on 9/3/24 with acute pain and swelling of her right ankle.     Pain diffuse, around the ankle itself. ( Not proximal where the nonunion is)    Review of Systems:  Constitutional/General: Negative for fever, chills, change in weight  Integumentary/Skin: Negative for worrisome rashes, moles, or lesions  Neuro: Negative for weakness, dizziness, or paresthesias   Psychiatric: negative for changes in mood or affect    OBJECTIVE:  Physical Exam:  /74 (BP Location: Left arm, Patient Position: Sitting, Cuff Size: Adult Large)   Pulse 65   SpO2 97%   General Appearance: healthy, alert and no distress   Skin: no suspicious lesions or rashes  Neuro: Normal strength and tone, mentation intact and speech normal  Vascular: good pulses, and capillary refill   Lymph: no lymphadenopathy   Psych:  mentation appears normal and affect normal/bright  Resp: no increased work of breathing    She has  pes planus, possible posterior tibial tendon dysfunction on the right   Right ankle swelling  Diffuse tenderness  No posterior tibial weakness to manual testing.   Ankles invert with toe stand.     EXAM: XR ANKLE RIGHT G/E 3 VIEWS  LOCATION: [facility]  DATE: 9/3/2024  There are postoperative changes from syndesmotic fixation and medial malleolus fixation. Hardware appears intact and well seated. There is a chronic appearing, displaced and nonunited fracture of the distal fibular shaft. There is also a small chronic   ossicle adjacent to the medial malleolus which likely relates to remote injury. There is diffuse osseous demineralization. No acute, displaced fracture is identified. There is soft tissue swelling over the ankle.    ASSESSMENT:   Ankle inflammation, possible posterior tibial tendon dysfunction and pes planus. I think this is causing some inflammation in the ankle.  I don't think this pain is coming from the midshaft fibular non union.       PLAN:   I suggested physical therapy, orthotics, nsaids.  Consider corticosteroid injection in the future if not better.      Return to clinic: as needed     THONG Medeiros MD  Dept. Orthopedic Surgery  [facility]

## 2024-09-10 ENCOUNTER — OFFICE VISIT (OUTPATIENT)
Dept: ORTHOPEDICS | Facility: CLINIC | Age: 39
End: 2024-09-10
Attending: PHYSICIAN ASSISTANT
Payer: COMMERCIAL

## 2024-09-10 ENCOUNTER — TELEPHONE (OUTPATIENT)
Dept: FAMILY MEDICINE | Facility: CLINIC | Age: 39
End: 2024-09-10

## 2024-09-10 VITALS — OXYGEN SATURATION: 97 % | DIASTOLIC BLOOD PRESSURE: 74 MMHG | SYSTOLIC BLOOD PRESSURE: 107 MMHG | HEART RATE: 65 BPM

## 2024-09-10 DIAGNOSIS — M21.41 PES PLANUS OF RIGHT FOOT: Primary | ICD-10-CM

## 2024-09-10 DIAGNOSIS — M25.571 ACUTE RIGHT ANKLE PAIN: ICD-10-CM

## 2024-09-10 DIAGNOSIS — Z98.890 S/P SURGICAL MANIPULATION OF ANKLE JOINT: ICD-10-CM

## 2024-09-10 DIAGNOSIS — M19.071 ANKLE INFLAMMATION, RIGHT: ICD-10-CM

## 2024-09-10 PROCEDURE — 99213 OFFICE O/P EST LOW 20 MIN: CPT | Performed by: ORTHOPAEDIC SURGERY

## 2024-09-10 RX ORDER — IBUPROFEN 600 MG/1
600 TABLET, FILM COATED ORAL EVERY 8 HOURS PRN
Qty: 90 TABLET | Refills: 1 | Status: SHIPPED | OUTPATIENT
Start: 2024-09-10

## 2024-09-10 ASSESSMENT — PAIN SCALES - GENERAL: PAINLEVEL: MODERATE PAIN (5)

## 2024-09-10 NOTE — TELEPHONE ENCOUNTER
Patient Quality Outreach    Patient is due for the following:   Cervical Cancer Screening - PAP Needed    Next Steps:   Schedule a office visit for pap    Type of outreach:    Sent letter.    Next Steps:  Reach out within 90 days via Phone.    Max number of attempts reached: No. Will try again in 90 days if patient still on fail list.    Questions for provider review:    None           TERRELL GARCIA MA  Chart routed to self  .

## 2024-09-10 NOTE — LETTER
September 10, 2024        To  Lani Lewis  [address]  [place] [facility] [address]        Your team at [facility] cares about your health. We have reviewed your chart and based on our findings; we are making the following recommendations to better manage your health.     You are in particular need of attention regarding the following:     Schedule a primary care office visit with your provider for a Pap Smear to screen for Cervical Cancer.    If you have already completed these items, please contact the clinic via phone or   [facility] so your care team can review and update your records. Thank you for   choosing [facility] for your healthcare needs. For any questions,   concerns, or to schedule an appointment please contact our clinic.    Healthy Regards,      Your [facility] Care Team

## 2024-09-10 NOTE — LETTER
9/10/2024      Lani Lewis  [address]  [place] [address]      Dear Colleague,    Thank you for referring your patient, Lani Lewis, to the [facility]. Please see a copy of my visit note below.    SUBJECTIVE:    Lani Lewis is a 39 year old female who is here today for follow up of her ORIF right ankle fracture on 3/12/21  Syndesmosis repair, with 2 tightropes. Proximal fibular fracture.  Seen with the help of a phone .  Last seen 12/13/22.  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  Shortening of the distal fibula, which could change the biomechanics of the ankle and lead to further ankle pain, possibly osteoarthritis  I told her that the hardware is unlikely to be the cause of the pain.    She saw Dr. 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  I also discussed with her that a complex union with anatomic relocation of the fibula would be a fairly difficult surgery as for the most part we need to completely skeletonized the fibula to bring it down and remove most of the soft tissue attachments.     Given the fact that she has barely any discomfort I would suggest to proceed with observation.  Patient was in agreement with the plan.    She presented to  on 9/3/24 with acute pain and swelling of her right ankle.     Pain diffuse, around the ankle itself. ( Not proximal where the nonunion is)    Review of Systems:  Constitutional/General: Negative for fever, chills, change in weight  Integumentary/Skin: Negative for worrisome rashes, moles, or lesions  Neuro: Negative for weakness, dizziness, or paresthesias   Psychiatric: negative for changes in mood or affect    OBJECTIVE:  Physical Exam:  /74 (BP Location: Left arm, Patient Position: Sitting, Cuff Size: Adult Large)   Pulse 65   SpO2 97%   General Appearance: healthy, alert and no distress   Skin: no suspicious lesions or rashes  Neuro: Normal  strength and tone, mentation intact and speech normal  Vascular: good pulses, and capillary refill   Lymph: no lymphadenopathy   Psych:  mentation appears normal and affect normal/bright  Resp: no increased work of breathing    She has pes planus, possible posterior tibial tendon dysfunction on the right   Right ankle swelling  Diffuse tenderness  No posterior tibial weakness to manual testing.   Ankles invert with toe stand.     EXAM: XR ANKLE RIGHT G/E 3 VIEWS  LOCATION: [facility]  DATE: 9/3/2024  There are postoperative changes from syndesmotic fixation and medial malleolus fixation. Hardware appears intact and well seated. There is a chronic appearing, displaced and nonunited fracture of the distal fibular shaft. There is also a small chronic   ossicle adjacent to the medial malleolus which likely relates to remote injury. There is diffuse osseous demineralization. No acute, displaced fracture is identified. There is soft tissue swelling over the ankle.    ASSESSMENT:   Ankle inflammation, possible posterior tibial tendon dysfunction and pes planus. I think this is causing some inflammation in the ankle.  I don't think this pain is coming from the midshaft fibular non union.       PLAN:   I suggested physical therapy, orthotics, nsaids.  Consider corticosteroid injection in the future if not better.      Return to clinic: as needed     THONG Medeiros MD  Dept. Orthopedic Surgery  [facility]         Again, thank you for allowing me to participate in the care of your patient.        Sincerely,        Eriberto Medeiros MD

## 2024-10-15 ENCOUNTER — THERAPY VISIT (OUTPATIENT)
Dept: PHYSICAL THERAPY | Facility: CLINIC | Age: 39
End: 2024-10-15
Attending: ORTHOPAEDIC SURGERY
Payer: COMMERCIAL

## 2024-10-15 DIAGNOSIS — M21.41 PES PLANUS OF RIGHT FOOT: ICD-10-CM

## 2024-10-15 DIAGNOSIS — M25.571 ACUTE RIGHT ANKLE PAIN: ICD-10-CM

## 2024-10-15 PROCEDURE — 97161 PT EVAL LOW COMPLEX 20 MIN: CPT | Mod: GP | Performed by: PHYSICAL THERAPIST

## 2024-10-15 PROCEDURE — 97110 THERAPEUTIC EXERCISES: CPT | Mod: GP | Performed by: PHYSICAL THERAPIST

## 2024-10-15 NOTE — PROGRESS NOTES
"PHYSICAL THERAPY EVALUATION  Type of Visit: Evaluation       Fall Risk Screen:  Fall screen completed by: PT  Have you fallen 2 or more times in the past year?: No  Have you fallen and had an injury in the past year?: No  Is patient a fall risk?: No    Subjective       Presenting condition or subjective complaint: Right ankle pain;  Has history of R ankle fracture 2021  Date of onset: 09/10/24 (date of MD order)    Relevant medical history: Pain at night or rest; Overweight; Diabetes; History of fractures   Dates & types of surgery:  Right ankle fracture- 2021    Prior diagnostic imaging/testing results: X-ray     Prior therapy history for the same diagnosis, illness or injury: Yes PT in 2021 after surgery    Prior Level of Function  Transfers: Independent  Ambulation: Independent  ADL: Independent  IADL: Housekeeping, Laundry, Meal preparation    Living Environment  Social support:     Type of home: House   Stairs to enter the home:         Ramp:     Stairs inside the home:         Help at home:    Equipment owned:       Employment:      Hobbies/Interests:      Patient goals for therapy: squat or walk with no pain    Pain assessment: See objective evaluation for additional pain details     Objective   FOOT/ANKLE EVALUATION  PAIN: Pain Level at Rest: 0/10  Pain Level with Use: 8/10  Pain Location: ankle  Pain Quality: Aching  Pain Frequency: daily  Pain is Exacerbated By: with squatting or walking to long  INTEGUMENTARY (edema, incisions): Figure 8: 21.24\" R ankle    GAIT:   Weightbearing Status: WBAT  Assistive Device(s): None  Gait Deviations: Pronation increased R    ROM:   (Degrees) Left AROM Left PROM  Right AROM Right PROM   Ankle Dorsiflexion   6    Ankle Plantarflexion   42    Ankle Inversion   32    Ankle Eversion   22    Great Toe Flexion       Great Toe Extension         STRENGTH:   Pain: - none + mild ++ moderate +++ severe  Strength Scale: 0-5/5 Left Right   Ankle Dorsiflexion  4   Ankle Plantarflexion  " 3   Ankle Inversion  4+   Ankle Eversion  4+   Great Toe Flexion     Great Toe Extension     Anterior Tibialis     Posterior Tibialis     Peroneals     Extensor Digitorum     Gastroc/Soleus         PALPATION:   + Tenderness at Location Left Right   Gastroc/Soleus     Achilles Tendon  +   Anterior Tibialis     Posterior Tibialis     Incisional     Deltoid Ligament     Plantar Fascia     Navicular     Medial Calcaneal     Peroneals     Anterior Talofibular Ligament     Posterior Talofibular Ligament     Calcaneofibular Ligament     Medial Malleolus     Lateral Malleolus     Anterior Tibiofibular Ligament     Posterior Tibiofibular Ligament           Assessment & Plan   CLINICAL IMPRESSIONS  Medical Diagnosis: Acute right ankle pain  Pes planus of right foot    Treatment Diagnosis: R ankle pain   Impression/Assessment: Patient is a 39 year old female with Right ankle pain complaints.  The following significant findings have been identified: Pain, Decreased ROM/flexibility, Impaired gait, Impaired muscle performance, and Decreased activity tolerance. These impairments interfere with their ability to perform self care tasks, household chores, household mobility, and community mobility as compared to previous level of function.     Clinical Decision Making (Complexity):  Clinical Presentation: Stable/Uncomplicated  Clinical Presentation Rationale: based on medical and personal factors listed in PT evaluation  Clinical Decision Making (Complexity): Low complexity    PLAN OF CARE  Treatment Interventions:  Interventions: Manual Therapy, Neuromuscular Re-education, Therapeutic Activity, Therapeutic Exercise    Long Term Goals     PT Goal 1  Goal Description: pt would like to beable to squat down without R ankle pain  Rationale: to maximize safety and independence with performance of ADLs and functional tasks;to maximize safety and independence within the home;to maximize safety and independence within the community  Goal  Progress: 8/10PL with squatting down  Target Date: 12/10/24      Frequency of Treatment: 1x/week  Duration of Treatment: x8 weeks    Recommended Referrals to Other Professionals: Physical Therapy  Education Assessment:   Learner/Method: Demonstration;Pictures/Video    Risks and benefits of evaluation/treatment have been explained.   Patient/Family/caregiver agrees with Plan of Care.     Evaluation Time:     PT Eval, Low Complexity Minutes (55949): 18       Signing Clinician: Cheryl Hale, ROBBIE        [facility]                                                                                   OUTPATIENT PHYSICAL THERAPY      PLAN OF TREATMENT FOR OUTPATIENT REHABILITATION   Patient's Last Name, First Name, Lani Hui YOB: 1985   Provider's Name   [facility]   Medical Record No.  [id number]     Onset Date: 09/10/24 (date of MD order)  Start of Care Date: 10/15/24     Medical Diagnosis:  Acute right ankle pain  Pes planus of right foot      PT Treatment Diagnosis:  R ankle pain Plan of Treatment  Frequency/Duration: 1x/week/ x8 weeks    Certification date from 10/15/24 to 12/10/24         See note for plan of treatment details and functional goals     Cheryl Hlae, PT                         I CERTIFY THE NEED FOR THESE SERVICES FURNISHED UNDER        THIS PLAN OF TREATMENT AND WHILE UNDER MY CARE     (Physician attestation of this document indicates review and certification of the therapy plan).              Referring Provider:  Eriberto Medeiros    Initial Assessment  See Epic Evaluation- Start of Care Date: 10/15/24

## 2024-11-05 ENCOUNTER — THERAPY VISIT (OUTPATIENT)
Dept: PHYSICAL THERAPY | Facility: CLINIC | Age: 39
End: 2024-11-05
Payer: COMMERCIAL

## 2024-11-05 DIAGNOSIS — M25.571 PAIN IN JOINT, ANKLE AND FOOT, RIGHT: Primary | Chronic | ICD-10-CM

## 2024-11-05 PROCEDURE — 97110 THERAPEUTIC EXERCISES: CPT | Mod: GP | Performed by: PHYSICAL THERAPIST

## 2024-11-05 PROCEDURE — 97140 MANUAL THERAPY 1/> REGIONS: CPT | Mod: GP | Performed by: PHYSICAL THERAPIST

## 2025-05-31 ENCOUNTER — RESULTS FOLLOW-UP (OUTPATIENT)
Dept: URGENT CARE | Facility: URGENT CARE | Age: 40
End: 2025-05-31

## 2025-05-31 ENCOUNTER — OFFICE VISIT (OUTPATIENT)
Dept: URGENT CARE | Facility: URGENT CARE | Age: 40
End: 2025-05-31
Payer: COMMERCIAL

## 2025-05-31 VITALS
BODY MASS INDEX: 44.25 KG/M2 | WEIGHT: 292 LBS | HEART RATE: 109 BPM | OXYGEN SATURATION: 100 % | RESPIRATION RATE: 20 BRPM | HEIGHT: 68 IN | SYSTOLIC BLOOD PRESSURE: 121 MMHG | TEMPERATURE: 101.1 F | DIASTOLIC BLOOD PRESSURE: 78 MMHG

## 2025-05-31 DIAGNOSIS — J02.0 ACUTE STREPTOCOCCAL PHARYNGITIS: Primary | ICD-10-CM

## 2025-05-31 DIAGNOSIS — Z75.8: ICD-10-CM

## 2025-05-31 DIAGNOSIS — R50.9 FEVER, UNSPECIFIED FEVER CAUSE: ICD-10-CM

## 2025-05-31 LAB — DEPRECATED S PYO AG THROAT QL EIA: POSITIVE

## 2025-05-31 PROCEDURE — 99213 OFFICE O/P EST LOW 20 MIN: CPT | Performed by: FAMILY MEDICINE

## 2025-05-31 PROCEDURE — 3078F DIAST BP <80 MM HG: CPT | Performed by: FAMILY MEDICINE

## 2025-05-31 PROCEDURE — 3074F SYST BP LT 130 MM HG: CPT | Performed by: FAMILY MEDICINE

## 2025-05-31 PROCEDURE — 87880 STREP A ASSAY W/OPTIC: CPT | Performed by: FAMILY MEDICINE

## 2025-05-31 RX ORDER — ACETAMINOPHEN 325 MG/1
650 TABLET ORAL ONCE
Status: COMPLETED | OUTPATIENT
Start: 2025-05-31 | End: 2025-05-31

## 2025-05-31 RX ORDER — PENICILLIN V POTASSIUM 500 MG/1
500 TABLET, FILM COATED ORAL 2 TIMES DAILY
Qty: 20 TABLET | Refills: 0 | Status: SHIPPED | OUTPATIENT
Start: 2025-05-31 | End: 2025-06-10

## 2025-05-31 RX ADMIN — ACETAMINOPHEN 650 MG: 325 TABLET ORAL at 15:37

## 2025-05-31 NOTE — PROGRESS NOTES
"Urgent Care Clinic Visit    Chief Complaint   Patient presents with    Urgent Care    Fever     Per patient states symptoms started 3 days ago having fever, chills, sweats, body aches, and headaches. Patient has been taking ibuprofen last dose was at 10AM she took 1000mg                5/31/2025     3:17 PM   Additional Questions   Roomed by DANII Brito   Accompanied by Daughter         Assessment & Plan     Lani was seen today for urgent care and fever.    Diagnoses and all orders for this visit:    Acute streptococcal pharyngitis  -     penicillin V (VEETID) 500 MG tablet; Take 1 tablet (500 mg) by mouth 2 times daily for 10 days.  -   Gargling with warm salt water will also reduce throat pain. Dissolve 1/2 teaspoon of salt in 1 glass of warm water. This may be useful just before meals.      Fever, unspecified fever cause  -     Streptococcus A Rapid Screen w/Reflex to PCR - Clinic Collect  -     acetaminophen (TYLENOL) tablet 650 mg x 1  - given here.      Lao  needed        - Daughter present. Helps to interpret.      Patient education and Handout with home care instructions given. See AVS for details.      BMI  Estimated body mass index is 44.4 kg/m  as calculated from the following:    Height as of this encounter: 1.727 m (5' 8\").    Weight as of this encounter: 132.5 kg (292 lb).   Weight management plan: Patient was referred to their PCP to discuss a diet and exercise plan.      Return in about 1 week (around 6/7/2025) for follow up with PCP if symptoms fail to improve.      Subjective   Lani is a 40 year old, presenting for the following health issues:  Urgent Care and Fever (Per patient states symptoms started 3 days ago having fever, chills, sweats, body aches, and headaches. Patient has been taking ibuprofen last dose was at 10AM she took 1000mg )      5/31/2025     3:17 PM   Additional Questions   Roomed by DANII Brito   Accompanied by Daughter     HPI      Lao  " needed- daughter helps to interpret.    Acute Illness   Acute illness concerns: fever, sore throat, general malaise  Onset: yesterday  Fever: YES  Chills/Sweats: YES  Headache (location?): YES  Sinus Pressure:no  Conjunctivitis:  no  Ear Pain: no  Rhinorrhea: no   Congestion: no   Sore Throat: YES   Cough: no  Wheeze: no   Decreased Appetite: YES  Nausea: no   Vomiting: no   Diarrhea:  no   Dysuria/Freq.: no   Fatigue/Achiness: YES  Sick/Strep Exposure: YES- children     Therapies Tried and outcome: Tylenol, Ibuprofen    Otherwise healthy. Took Ibuprofen earlier today. Requesting for Tylenol here.    Patient Active Problem List   Diagnosis    Female genital infibulation    H/O vitamin D deficiency    History of gestational diabetes    History of macrosomia in infant in prior pregnancy, currently pregnant    Maternal obesity affecting pregnancy, antepartum    Need for Tdap vaccination    Encounter for supervision of other normal pregnancy in third trimester    Indication for care in labor and delivery, antepartum    Nexplanon in place    Pain in joint, ankle and foot, right    Morbid obesity (H)    History of fibula [id number]     Past Surgical History:   Procedure Laterality Date    NO HISTORY OF SURGERY      OPEN REDUCTION INTERNAL FIXATION ANKLE BILATERAL      ORIF right ankle fracture on 3/12/21       Social History     Tobacco Use    Smoking status: Never    Smokeless tobacco: Never   Substance Use Topics    Alcohol use: No     Family History   Problem Relation Age of Onset    Diabetes Father          Current Outpatient Medications   Medication Sig Dispense Refill    penicillin V (VEETID) 500 MG tablet Take 1 tablet (500 mg) by mouth 2 times daily for 10 days. 20 tablet 0    acetaminophen (TYLENOL) 325 MG tablet Take 325-650 mg by mouth (Patient not taking: Reported on 10/19/2023)      etonogestrel (NEXPLANON) 68 MG IMPL Inject 1 each Subcutaneous (Patient not taking: Reported on 9/3/2024)       "ibuprofen (ADVIL/MOTRIN) 600 MG tablet Take 1 tablet (600 mg) by mouth every 8 hours as needed for moderate pain. 90 tablet 1    naproxen (NAPROSYN) 500 MG tablet Take 1 tablet (500 mg) by mouth 2 times daily (with meals) (Patient not taking: Reported on 5/6/2024) 30 tablet 0     No Known Allergies    Review of Systems  Constitutional, HEENT, cardiovascular, pulmonary, gi and gu systems are negative, except as otherwise noted.      Objective    /78   Pulse 109   Temp 101.1  F (38.4  C) (Oral)   Resp 20   Ht 1.727 m (5' 8\")   Wt 132.5 kg (292 lb)   SpO2 100%   BMI 44.40 kg/m    Body mass index is 44.4 kg/m .  Physical Exam   GENERAL: alert and no distress  EYES: Eyes grossly normal to inspection, PERRL and conjunctivae and sclerae normal  HENT: ear canals and TM's normal, bilateral erythematous tonsillar enlargement. No exudates.  NECK: no adenopathy, no asymmetry, masses, or scars  RESP: lungs clear to auscultation - no rales, rhonchi or wheezes  CV: regular rate and rhythm, normal S1 S2, no S3 or S4, no murmur, click or rub, no peripheral edema  PSYCH: mentation appears normal, affect normal/bright    DATA  Reviewed and discussed with patient prior to discharge.  Results for orders placed or performed in visit on 05/31/25   Streptococcus A Rapid Screen w/Reflex to PCR - Clinic Collect     Status: Abnormal    Specimen: Throat; Swab   Result Value Ref Range    Group A Strep antigen Positive (A) Negative             Signed Electronically by: Halima Caicedo MD    "

## 2025-05-31 NOTE — NURSING NOTE
Clinic Administered Medication Documentation    Patient was given Tylenol 650mg. Prior to medication administration, verified patient's identity using patient's name and date of birth.    Alisha Sol, CMA

## 2025-07-03 ENCOUNTER — OFFICE VISIT (OUTPATIENT)
Dept: URGENT CARE | Facility: URGENT CARE | Age: 40
End: 2025-07-03
Payer: COMMERCIAL

## 2025-07-03 ENCOUNTER — ANCILLARY PROCEDURE (OUTPATIENT)
Dept: GENERAL RADIOLOGY | Facility: CLINIC | Age: 40
End: 2025-07-03
Payer: COMMERCIAL

## 2025-07-03 VITALS
DIASTOLIC BLOOD PRESSURE: 85 MMHG | SYSTOLIC BLOOD PRESSURE: 123 MMHG | BODY MASS INDEX: 44.55 KG/M2 | TEMPERATURE: 98 F | WEIGHT: 293 LBS | HEART RATE: 76 BPM | OXYGEN SATURATION: 100 % | RESPIRATION RATE: 18 BRPM

## 2025-07-03 DIAGNOSIS — R05.1 ACUTE COUGH: ICD-10-CM

## 2025-07-03 DIAGNOSIS — J20.9 ACUTE BRONCHITIS, UNSPECIFIED ORGANISM: Primary | ICD-10-CM

## 2025-07-03 DIAGNOSIS — R03.0 ELEVATED BLOOD PRESSURE READING WITHOUT DIAGNOSIS OF HYPERTENSION: ICD-10-CM

## 2025-07-03 RX ORDER — BENZONATATE 200 MG/1
200 CAPSULE ORAL 3 TIMES DAILY PRN
Qty: 21 CAPSULE | Refills: 0 | Status: SHIPPED | OUTPATIENT
Start: 2025-07-03

## 2025-07-03 ASSESSMENT — PAIN SCALES - GENERAL: PAINLEVEL_OUTOF10: SEVERE PAIN (8)

## 2025-07-03 NOTE — PROGRESS NOTES
Urgent Care Clinic Visit    Chief Complaint   Patient presents with    URI     2 weeks ago had the flu - has been having sore throat, dry mouth and throat, sob especially at night, dry cough, chest tightness               7/3/2025     2:20 PM   Additional Questions   Roomed by Joanne   Accompanied by Self

## 2025-07-03 NOTE — PROGRESS NOTES
ASSESSMENT:  (J20.9) Acute bronchitis, unspecified organism  (primary encounter diagnosis)  Plan: benzonatate (TESSALON) 200 MG capsule    (R03.0) Elevated blood pressure reading without diagnosis of hypertension    (R05.1) Acute cough  Plan: XR Chest 2 Views    PLAN:  Informed the patient that the chest x-ray shows the following per the radiologist report: No definite change since the prior examination. The lungs are clear. No pleural effusion or pneumothorax. Top normal heart size. The mediastinum is normal in size. Small osteophytes thoracic spine.  Bronchitis patient instructions discussed and provided.  Informed the patient to take the benzonatate as prescribed.  We discussed the need to follow-up with her primary care provider should symptoms persist and to discuss her elevated blood pressure.  We also discussed returning to clinic with any new or worsening symptoms.  Patient acknowledged understanding of the above plan.    The use of Dragon/Shiny Media[employer] services may have been used to construct the content in this note; any grammatical or spelling errors are non-intentional. Please contact the author of this note directly if you are in need of any clarification.      ROSANNA Spangler CNP      SUBJECTIVE:   Lani Lewis is a 40 year old female presenting with a chief complaint of dry throat, sore throat and dry cough.  Patient indicates they were sick 2 weeks ago and have had the symptoms lingering since then.  Patient denies: runny nose, stuffy nose, ear pain, vomiting, and diarrhea  Treatment measures tried include None tried.  Predisposing factors include None.    ROS:  Negative except noted above.    OBJECTIVE:  BP (!) 163/103 (BP Location: Left arm, Patient Position: Sitting, Cuff Size: Adult Regular)   Pulse 76   Temp 98  F (36.7  C) (Oral)   Resp 18   Wt 132.9 kg (293 lb)   LMP 07/03/2025 (Exact Date)   SpO2 100%   Breastfeeding No   BMI 44.55 kg/m    GENERAL APPEARANCE:  healthy, alert and no distress  EYES: EOMI,  PERRL, conjunctiva clear  HENT: nose and mouth without erythema, ulcers or lesions and oral mucous membranes moist, no erythema noted  NECK: supple, nontender, no lymphadenopathy  RESP: lungs clear to auscultation - no rales, rhonchi or wheezes  CV: regular rates and rhythm, normal S1 S2, no murmur noted  SKIN: no suspicious lesions or rashes    X-RAY: Chest x-ray shows the following per the radiologist report: No definite change since the prior examination. The lungs are clear. No pleural effusion or pneumothorax. Top normal heart size. The mediastinum is normal in size. Small osteophytes thoracic spine.

## 2025-07-03 NOTE — PATIENT INSTRUCTIONS
Chest x-ray shows the following per the radiologist report: No definite change since the prior examination. The lungs are clear. No pleural effusion or pneumothorax. Top normal heart size. The mediastinum is normal in size. Small osteophytes thoracic spine.  Take the benzonatate as prescribed.  Follow-up with your primary care provider should symptoms persist and to discuss your elevated blood pressure.